# Patient Record
Sex: FEMALE | Race: WHITE | Employment: OTHER | URBAN - METROPOLITAN AREA
[De-identification: names, ages, dates, MRNs, and addresses within clinical notes are randomized per-mention and may not be internally consistent; named-entity substitution may affect disease eponyms.]

---

## 2017-11-17 ENCOUNTER — HOSPITAL ENCOUNTER (OUTPATIENT)
Dept: MAMMOGRAPHY | Age: 82
Discharge: HOME | End: 2017-11-17
Attending: INTERNAL MEDICINE
Payer: MEDICARE

## 2017-11-17 DIAGNOSIS — Z85.3 PERSONAL HISTORY OF MALIGNANT NEOPLASM OF BREAST: ICD-10-CM

## 2017-11-17 DIAGNOSIS — Z12.31 ENCOUNTER FOR MAMMOGRAM TO ESTABLISH BASELINE MAMMOGRAM: ICD-10-CM

## 2017-11-17 PROCEDURE — 77067 SCR MAMMO BI INCL CAD: CPT

## 2018-11-19 ENCOUNTER — HOSPITAL ENCOUNTER (OUTPATIENT)
Dept: MAMMOGRAPHY | Age: 83
Discharge: HOME | End: 2018-11-19
Attending: INTERNAL MEDICINE
Payer: MEDICARE

## 2018-11-19 DIAGNOSIS — Z12.39 SCREENING BREAST EXAMINATION: ICD-10-CM

## 2018-11-19 PROCEDURE — 77067 SCR MAMMO BI INCL CAD: CPT

## 2019-11-25 ENCOUNTER — HOSPITAL ENCOUNTER (EMERGENCY)
Age: 84
Discharge: HOME | End: 2019-11-25
Attending: EMERGENCY MEDICINE
Payer: MEDICARE

## 2019-11-25 ENCOUNTER — APPOINTMENT (OUTPATIENT)
Dept: GENERAL RADIOLOGY | Age: 84
End: 2019-11-25
Attending: EMERGENCY MEDICINE
Payer: MEDICARE

## 2019-11-25 ENCOUNTER — APPOINTMENT (OUTPATIENT)
Dept: CT IMAGING | Age: 84
End: 2019-11-25
Attending: EMERGENCY MEDICINE
Payer: MEDICARE

## 2019-11-25 VITALS
RESPIRATION RATE: 16 BRPM | BODY MASS INDEX: 27.32 KG/M2 | HEIGHT: 66 IN | SYSTOLIC BLOOD PRESSURE: 162 MMHG | OXYGEN SATURATION: 96 % | HEART RATE: 78 BPM | DIASTOLIC BLOOD PRESSURE: 74 MMHG | WEIGHT: 170 LBS | TEMPERATURE: 97.7 F

## 2019-11-25 DIAGNOSIS — R29.6 RECURRENT FALLS: ICD-10-CM

## 2019-11-25 DIAGNOSIS — R10.84 ABDOMINAL PAIN, GENERALIZED: ICD-10-CM

## 2019-11-25 DIAGNOSIS — R19.7 DIARRHEA, UNSPECIFIED TYPE: Primary | ICD-10-CM

## 2019-11-25 LAB
ALBUMIN SERPL-MCNC: 3.1 G/DL (ref 3.5–5)
ALBUMIN/GLOB SERPL: 1 {RATIO} (ref 1.1–2.2)
ALP SERPL-CCNC: 283 U/L (ref 45–117)
ALT SERPL-CCNC: 28 U/L (ref 12–78)
ANION GAP SERPL CALC-SCNC: 4 MMOL/L (ref 5–15)
APPEARANCE UR: CLEAR
AST SERPL-CCNC: 40 U/L (ref 15–37)
BACTERIA URNS QL MICRO: NEGATIVE /HPF
BASOPHILS # BLD: 0 K/UL (ref 0–0.1)
BASOPHILS NFR BLD: 0 % (ref 0–1)
BILIRUB SERPL-MCNC: 0.6 MG/DL (ref 0.2–1)
BILIRUB UR QL: NEGATIVE
BUN SERPL-MCNC: 20 MG/DL (ref 6–20)
BUN/CREAT SERPL: 35 (ref 12–20)
CALCIUM SERPL-MCNC: 9.3 MG/DL (ref 8.5–10.1)
CHLORIDE SERPL-SCNC: 104 MMOL/L (ref 97–108)
CO2 SERPL-SCNC: 29 MMOL/L (ref 21–32)
COLOR UR: ABNORMAL
CREAT SERPL-MCNC: 0.57 MG/DL (ref 0.55–1.02)
DIFFERENTIAL METHOD BLD: ABNORMAL
EOSINOPHIL # BLD: 0.2 K/UL (ref 0–0.4)
EOSINOPHIL NFR BLD: 2 % (ref 0–7)
EPITH CASTS URNS QL MICRO: ABNORMAL /LPF
ERYTHROCYTE [DISTWIDTH] IN BLOOD BY AUTOMATED COUNT: 13.2 % (ref 11.5–14.5)
GLOBULIN SER CALC-MCNC: 3.2 G/DL (ref 2–4)
GLUCOSE SERPL-MCNC: 104 MG/DL (ref 65–100)
GLUCOSE UR STRIP.AUTO-MCNC: NEGATIVE MG/DL
HCT VFR BLD AUTO: 36.8 % (ref 35–47)
HGB BLD-MCNC: 11.8 G/DL (ref 11.5–16)
HGB UR QL STRIP: NEGATIVE
HYALINE CASTS URNS QL MICRO: ABNORMAL /LPF (ref 0–5)
IMM GRANULOCYTES # BLD AUTO: 0 K/UL (ref 0–0.04)
IMM GRANULOCYTES NFR BLD AUTO: 0 % (ref 0–0.5)
KETONES UR QL STRIP.AUTO: NEGATIVE MG/DL
LACTATE BLD-SCNC: 0.89 MMOL/L (ref 0.4–2)
LEUKOCYTE ESTERASE UR QL STRIP.AUTO: ABNORMAL
LIPASE SERPL-CCNC: 127 U/L (ref 73–393)
LYMPHOCYTES # BLD: 1.5 K/UL (ref 0.8–3.5)
LYMPHOCYTES NFR BLD: 17 % (ref 12–49)
MCH RBC QN AUTO: 32.4 PG (ref 26–34)
MCHC RBC AUTO-ENTMCNC: 32.1 G/DL (ref 30–36.5)
MCV RBC AUTO: 101.1 FL (ref 80–99)
MONOCYTES # BLD: 0.8 K/UL (ref 0–1)
MONOCYTES NFR BLD: 8 % (ref 5–13)
NEUTS SEG # BLD: 6.5 K/UL (ref 1.8–8)
NEUTS SEG NFR BLD: 73 % (ref 32–75)
NITRITE UR QL STRIP.AUTO: NEGATIVE
NRBC # BLD: 0 K/UL (ref 0–0.01)
NRBC BLD-RTO: 0 PER 100 WBC
PH UR STRIP: 6 [PH] (ref 5–8)
PLATELET # BLD AUTO: 228 K/UL (ref 150–400)
PMV BLD AUTO: 9.5 FL (ref 8.9–12.9)
POTASSIUM SERPL-SCNC: 3.9 MMOL/L (ref 3.5–5.1)
PROT SERPL-MCNC: 6.3 G/DL (ref 6.4–8.2)
PROT UR STRIP-MCNC: NEGATIVE MG/DL
RBC # BLD AUTO: 3.64 M/UL (ref 3.8–5.2)
RBC #/AREA URNS HPF: ABNORMAL /HPF (ref 0–5)
SODIUM SERPL-SCNC: 137 MMOL/L (ref 136–145)
SP GR UR REFRACTOMETRY: 1.01 (ref 1–1.03)
TROPONIN I SERPL-MCNC: <0.05 NG/ML
UA: UC IF INDICATED,UAUC: ABNORMAL
UROBILINOGEN UR QL STRIP.AUTO: 0.2 EU/DL (ref 0.2–1)
WBC # BLD AUTO: 9 K/UL (ref 3.6–11)
WBC URNS QL MICRO: ABNORMAL /HPF (ref 0–4)

## 2019-11-25 PROCEDURE — 83605 ASSAY OF LACTIC ACID: CPT

## 2019-11-25 PROCEDURE — 84484 ASSAY OF TROPONIN QUANT: CPT

## 2019-11-25 PROCEDURE — 71045 X-RAY EXAM CHEST 1 VIEW: CPT

## 2019-11-25 PROCEDURE — 74011636320 HC RX REV CODE- 636/320: Performed by: EMERGENCY MEDICINE

## 2019-11-25 PROCEDURE — 80053 COMPREHEN METABOLIC PANEL: CPT

## 2019-11-25 PROCEDURE — 81001 URINALYSIS AUTO W/SCOPE: CPT

## 2019-11-25 PROCEDURE — 36415 COLL VENOUS BLD VENIPUNCTURE: CPT

## 2019-11-25 PROCEDURE — 99285 EMERGENCY DEPT VISIT HI MDM: CPT

## 2019-11-25 PROCEDURE — 73610 X-RAY EXAM OF ANKLE: CPT

## 2019-11-25 PROCEDURE — 74177 CT ABD & PELVIS W/CONTRAST: CPT

## 2019-11-25 PROCEDURE — 87040 BLOOD CULTURE FOR BACTERIA: CPT

## 2019-11-25 PROCEDURE — 74011250636 HC RX REV CODE- 250/636: Performed by: EMERGENCY MEDICINE

## 2019-11-25 PROCEDURE — 85025 COMPLETE CBC W/AUTO DIFF WBC: CPT

## 2019-11-25 PROCEDURE — 87086 URINE CULTURE/COLONY COUNT: CPT

## 2019-11-25 PROCEDURE — 73630 X-RAY EXAM OF FOOT: CPT

## 2019-11-25 PROCEDURE — 83690 ASSAY OF LIPASE: CPT

## 2019-11-25 RX ORDER — OMEPRAZOLE 20 MG/1
20 CAPSULE, DELAYED RELEASE ORAL DAILY
COMMUNITY

## 2019-11-25 RX ORDER — LATANOPROST 50 UG/ML
1 SOLUTION/ DROPS OPHTHALMIC
COMMUNITY

## 2019-11-25 RX ORDER — NAPROXEN 250 MG/1
500 TABLET ORAL 2 TIMES DAILY WITH MEALS
COMMUNITY

## 2019-11-25 RX ORDER — CHOLECALCIFEROL (VITAMIN D3) 125 MCG
CAPSULE ORAL
COMMUNITY

## 2019-11-25 RX ORDER — SODIUM CHLORIDE 0.9 % (FLUSH) 0.9 %
10 SYRINGE (ML) INJECTION
Status: COMPLETED | OUTPATIENT
Start: 2019-11-25 | End: 2019-11-25

## 2019-11-25 RX ORDER — CYCLOBENZAPRINE HCL 10 MG
10 TABLET ORAL
COMMUNITY

## 2019-11-25 RX ADMIN — IOPAMIDOL 100 ML: 755 INJECTION, SOLUTION INTRAVENOUS at 16:57

## 2019-11-25 RX ADMIN — Medication 10 ML: at 16:57

## 2019-11-25 RX ADMIN — SODIUM CHLORIDE 1000 ML: 900 INJECTION, SOLUTION INTRAVENOUS at 14:55

## 2019-11-25 NOTE — ED NOTES
Ambulated pt about 50ft in the hallway with a walker which is her baseline. Pt had a steady gait. Pt required no more assistance than the walker provided.
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
## 2019-11-26 NOTE — DISCHARGE INSTRUCTIONS
You were evaluated in the emergency department for diarrhea and abdominal pain. Your examination was reassuring as was your work-up including CT scan and blood work. It will be important for you to follow-up with your primary care physician in 2-5 days. If you develop worsening symptoms such as unable to eat/drink, worsening fevers, or worsening abdominal pain, please return to the emergency department immediately.

## 2019-11-26 NOTE — ED NOTES
Dr. Antonio Cali has reviewed discharge instructions with the patient and family. The patient and family verbalized understanding. Patient discharged in wheelchair to car in no distress with family.

## 2019-11-26 NOTE — ED NOTES
Bedside and Verbal shift change report given to Mary Paredes (oncoming nurse) by Liberty Delgado (offgoing nurse). Report included the following information SBAR, ED Summary, MAR and Recent Results.

## 2019-11-27 LAB
BACTERIA SPEC CULT: NORMAL
CC UR VC: NORMAL
SERVICE CMNT-IMP: NORMAL

## 2019-11-30 LAB
BACTERIA SPEC CULT: NORMAL
SERVICE CMNT-IMP: NORMAL

## 2020-06-10 ENCOUNTER — HOSPITAL ENCOUNTER (OUTPATIENT)
Dept: MAMMOGRAPHY | Age: 85
Discharge: HOME | End: 2020-06-10
Attending: FAMILY MEDICINE
Payer: MEDICARE

## 2020-06-10 DIAGNOSIS — Z12.31 VISIT FOR SCREENING MAMMOGRAM: ICD-10-CM

## 2020-06-10 PROCEDURE — 77067 SCR MAMMO BI INCL CAD: CPT

## 2021-05-19 ENCOUNTER — TRANSCRIBE ORDER (OUTPATIENT)
Dept: SCHEDULING | Age: 86
End: 2021-05-19

## 2021-05-19 DIAGNOSIS — Z12.31 BREAST CANCER SCREENING BY MAMMOGRAM: Primary | ICD-10-CM

## 2021-06-11 ENCOUNTER — HOSPITAL ENCOUNTER (OUTPATIENT)
Dept: MAMMOGRAPHY | Age: 86
Discharge: HOME | End: 2021-06-11
Attending: INTERNAL MEDICINE
Payer: MEDICARE

## 2021-06-11 DIAGNOSIS — Z12.31 BREAST CANCER SCREENING BY MAMMOGRAM: ICD-10-CM

## 2021-06-11 PROCEDURE — 77067 SCR MAMMO BI INCL CAD: CPT

## 2022-05-04 ENCOUNTER — APPOINTMENT (OUTPATIENT)
Dept: GENERAL RADIOLOGY | Age: 87
End: 2022-05-04
Attending: EMERGENCY MEDICINE
Payer: MEDICARE

## 2022-05-04 ENCOUNTER — HOSPITAL ENCOUNTER (EMERGENCY)
Age: 87
Discharge: HOME | End: 2022-05-04
Attending: EMERGENCY MEDICINE
Payer: MEDICARE

## 2022-05-04 ENCOUNTER — APPOINTMENT (OUTPATIENT)
Dept: GENERAL RADIOLOGY | Age: 87
End: 2022-05-04
Attending: STUDENT IN AN ORGANIZED HEALTH CARE EDUCATION/TRAINING PROGRAM
Payer: MEDICARE

## 2022-05-04 ENCOUNTER — APPOINTMENT (OUTPATIENT)
Dept: GENERAL RADIOLOGY | Age: 87
End: 2022-05-04
Attending: PHYSICIAN ASSISTANT
Payer: MEDICARE

## 2022-05-04 VITALS
TEMPERATURE: 98.9 F | BODY MASS INDEX: 24.43 KG/M2 | HEART RATE: 91 BPM | HEIGHT: 66 IN | RESPIRATION RATE: 18 BRPM | OXYGEN SATURATION: 95 % | SYSTOLIC BLOOD PRESSURE: 123 MMHG | DIASTOLIC BLOOD PRESSURE: 65 MMHG | WEIGHT: 152 LBS
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
## 2022-05-04 DIAGNOSIS — M79.604 RIGHT LEG PAIN: ICD-10-CM

## 2022-05-04 PROCEDURE — 73610 X-RAY EXAM OF ANKLE: CPT

## 2022-05-04 PROCEDURE — 73502 X-RAY EXAM HIP UNI 2-3 VIEWS: CPT

## 2022-05-04 PROCEDURE — 99283 EMERGENCY DEPT VISIT LOW MDM: CPT

## 2022-05-04 PROCEDURE — 73552 X-RAY EXAM OF FEMUR 2/>: CPT

## 2022-05-04 PROCEDURE — 74011250637 HC RX REV CODE- 250/637: Performed by: PHYSICIAN ASSISTANT

## 2022-05-04 PROCEDURE — 73630 X-RAY EXAM OF FOOT: CPT

## 2022-05-04 PROCEDURE — 73590 X-RAY EXAM OF LOWER LEG: CPT

## 2022-05-04 RX ORDER — ACETAMINOPHEN 325 MG/1
650 TABLET ORAL
Qty: 20 TABLET | Refills: 0 | Status: SHIPPED | OUTPATIENT
Start: 2022-05-04

## 2022-05-04 RX ORDER — ACETAMINOPHEN 500 MG
1000 TABLET ORAL
Status: COMPLETED | OUTPATIENT
Start: 2022-05-04 | End: 2022-05-04

## 2022-05-04 RX ORDER — TRAMADOL HYDROCHLORIDE 50 MG/1
50 TABLET ORAL
Status: DISCONTINUED | OUTPATIENT
Start: 2022-05-04 | End: 2022-05-04

## 2022-05-04 RX ORDER — DICLOFENAC SODIUM 10 MG/G
2 GEL TOPICAL 4 TIMES DAILY
Qty: 1 EACH | Refills: 0 | Status: SHIPPED
Start: 2022-05-04 | End: 2022-05-17

## 2022-05-04 RX ADMIN — ACETAMINOPHEN 1000 MG: 500 TABLET ORAL at 15:51

## 2022-05-04 NOTE — DISCHARGE INSTRUCTIONS
Call your primary care doctor first thing tomorrow to discuss your visit to the emergency department and assistance with possible home health/physical therapy. If you are continuing to have pain in your leg after 2 to 3 days, follow-up with your primary care doctor or orthopedic doctor listed in your paperwork for further evaluation.   You may return the emergency department sooner if you have any new or worsening symptoms

## 2022-05-04 NOTE — ED PROVIDER NOTES
EMERGENCY DEPARTMENT HISTORY AND PHYSICAL EXAM      Date: 5/4/2022  Patient Name: Rhina Son    History of Presenting Illness     Chief Complaint   Patient presents with   [facility] Fall     EMS reports that she slid frpm chair, right hip and right ankle       History Provided By: Patient    HPI: Rhina Son, 80 y.o. female with a past medical history of hypertension, hyperlipidemia, hypothyroidism, hearing impairment who presents emergency department after ground-level fall. She is accompanied by her 2 daughters who did not witness the fall, but help provide amplifying history. Patient was getting out of bed this morning and slipped, landing on her right hip. She has a history of carpal tunnel and trigger finger, which results in weak  strength. He denies any preceding symptoms to include headache, chest pain, shortness of breath, palpitations or dizziness. Patient was not able to get up and therefore called EMS. Patient complains primarily of pain on her right hip/leg and right ankle. In the ED she denies any headache, dizziness, vision changes, chest pain, shortness of breath, abdominal pain, nausea, vomiting, bowel or bladder changes, paresthesias or motor weakness. Per the daughters, patient does live at home by herself despite their efforts to place her into an assisted living facility. There are no other complaints, changes, or physical findings at this time. PCP: Dejan Ellsworth MD    No current facility-administered medications on file prior to encounter. Current Outpatient Medications on File Prior to Encounter   Medication Sig Dispense Refill    naproxen (NAPROSYN) 250 mg tablet Take 500 mg by mouth two (2) times daily (with meals).  omeprazole (PRILOSEC) 20 mg capsule Take 20 mg by mouth daily.  cyclobenzaprine (FLEXERIL) 10 mg tablet Take 10 mg by mouth.  latanoprost (XALATAN) 0.005 % ophthalmic solution Administer 1 Drop to both eyes nightly.       cholecalciferol, vitamin D3, 2,000 unit tab Take  by mouth.  senna-docusate (PERICOLACE) 8.6-50 mg per tablet Take 1 Tab by mouth two (2) times a day. 30 Tab 0    acetaminophen 650 mg Tab Take 650 mg by mouth every four (4) hours as needed for Pain. 40 Tab 0    furosemide (LASIX) 40 mg tablet Take 40 mg by mouth daily.  levothyroxine (SYNTHROID) 100 mcg tablet Take 100 mcg by mouth daily.  lovastatin (MEVACOR) 20 mg tablet Take 20 mg by mouth daily.  SENNOSIDES (SENNA LAX PO) Take  by mouth as needed. Past History     Past Medical History:  Past Medical History:   Diagnosis Date    Arthritis     Breast cancer ([facility] [address] 1998    right breast    CAD (coronary artery disease)     2 heart attacks    Cancer (HCC)     breast - treated with surgery    GERD (gastroesophageal reflux disease)     hiatal hernia    Other ill-defined conditions(799.89)     increased cholesterol    PUD (peptic ulcer disease)     Thyroid disease        Past Surgical History:  Past Surgical History:   Procedure Laterality Date    HX CHOLECYSTECTOMY      HX DILATION AND CURETTAGE      x 3-4    HX HYSTERECTOMY      total hysterectomy    HX MASTECTOMY Right 1998    right    HX ORTHOPAEDIC  12/31/12    PATELLA OPEN REDUCTION INTERNAL FIXATION (Left       Family History:  Family History   Problem Relation Age of Onset    Thyroid Disease Mother     Lung Disease Father     Other Daughter         \"almost lost her\" - unsure of details    Breast Cancer Daughter 55       Social History:  Social History     Tobacco Use    Smoking status: Former Smoker    Smokeless tobacco: Not on file    Tobacco comment: former cigarette smoker for 5 yrs/quit 15+ yrs ago   Substance Use Topics    Alcohol use: No    Drug use: Not on file       Allergies:   Allergies   Allergen Reactions    Codeine Nausea and Vomiting    Penicillins Itching and Swelling     swelling at injection site         Review of Systems   Review of Systems Constitutional: Negative for fever. HENT: Negative for dental problem. Eyes: Negative for visual disturbance. Respiratory: Negative for shortness of breath. Cardiovascular: Negative for chest pain. Gastrointestinal: Negative for abdominal pain, diarrhea, nausea and vomiting. Musculoskeletal: Positive for arthralgias. Negative for joint swelling and myalgias. Skin: Negative for rash. Neurological: Negative for weakness, numbness and headaches. Hematological: Does not bruise/bleed easily. All other systems reviewed and are negative. Physical Exam   Physical Exam  Vitals and nursing note reviewed. Constitutional:       General: She is not in acute distress. Appearance: Normal appearance. She is not ill-appearing or diaphoretic. Comments: Patient well-appearing, no acute distress. Patient does have some difficulties hearing, but able to interpret simple sentences and respond. HENT:      Head: Normocephalic and atraumatic. Comments: Head normocephalic with no bruising, swelling or deformities. No signs of trauma. Right Ear: External ear normal.      Left Ear: External ear normal.      Nose: Nose normal. No rhinorrhea. Mouth/Throat:      Mouth: Mucous membranes are moist.   Eyes:      Conjunctiva/sclera: Conjunctivae normal.      Pupils: Pupils are equal, round, and reactive to light. Cardiovascular:      Rate and Rhythm: Normal rate and regular rhythm. Pulses: Normal pulses. Heart sounds: Normal heart sounds. No murmur heard. No friction rub. No gallop. Pulmonary:      Effort: No respiratory distress. Breath sounds: No stridor. No wheezing, rhonchi or rales. Abdominal:      General: Abdomen is flat. There is no distension. Tenderness: There is no abdominal tenderness. There is no guarding. Musculoskeletal:         General: Tenderness present. No swelling, deformity or signs of injury.       Cervical back: Normal range of motion and neck supple. No tenderness. Comments: Right hip normal to inspection with no ecchymosis swelling or erythema. Soft tissue and bony tenderness on lateral right leg extending over mid thigh. No skin tenting or deformity + mild recent producible pain with internal rotation of right hip. Otherwise no reproducible pain with passive range of motion of the right or left hip. No pain with passive range of motion of the knee. Knee normal to inspection. Right ankle and foot with moderate swelling over dorsum of foot. No ecchymosis or bony deformity   Skin:     General: Skin is warm. Neurological:      General: No focal deficit present. Mental Status: She is alert and oriented to person, place, and time. Mental status is at baseline. Sensory: No sensory deficit. Motor: No weakness. Gait: Gait normal.         Diagnostic Study Results     Labs -   No results found for this or any previous visit (from the past 12 hour(s)). Radiologic Studies -   XR FEMUR RT 2 VS   Final Result   No acute fracture. XR TIB/FIB RT   Final Result      No fracture within the limitation of osteopenia. XR FOOT RT MIN 3 V   Final Result      No fracture within the limitation of osteopenia. XR HIP RT W OR WO PELV 2-3 VWS   Final Result   No acute abnormality. XR ANKLE RT MIN 3 V   Final Result   No acute fracture or dislocation. CT Results  (Last 48 hours)    None        CXR Results  (Last 48 hours)    None            Medical Decision Making   I am the first provider for this patient. I reviewed the vital signs, available nursing notes, past medical history, past surgical history, family history and social history. Vital Signs-Reviewed the patient's vital signs.   Patient Vitals for the past 12 hrs:   Temp Pulse Resp BP SpO2   05/04/22 1330 98.9 °F (37.2 °C) 91 18 123/65 95 %       Records Reviewed: Nursing Notes    Provider Notes (Medical Decision Making):   Patient evaluated for a ground-level fall onto her right side earlier today. She had no preceding or subsequent symptoms concerning for nonmechanical fall. On exam she had a GCS of 15 with no obvious signs of emergent trauma. Her pain was largely localized to her right foot and right mid/proximal right thigh. X-rays of the right hip, thigh, leg and foot showed no acute fracture or dislocation. She had no turning pain with passive range of motion of hips bilaterally concerning for fracture. She was able to ambulate and bear weight on her legs. Patient had 2 daughters at bedside during the patient's ED course. Daughters expressed the desire to return home with the patient and that they would have the ability to give her 24-hour care over the next few days. They were advised on follow-up with a PCP to discuss her recurrent falls and possible therapy versus placement at a assisted living facility. They are advised on follow-up with orthopedic doctor if patient continued to have pain beyond 2 to 3 days. They were advised on return precautions to the emergency department. patient and daughters expressed understanding agreement to discharge instructions and treatment plan. ED Course:   Initial assessment performed. The patients presenting problems have been discussed, and they are in agreement with the care plan formulated and outlined with them. I have encouraged them to ask questions as they arise throughout their visit. ED Course as of 05/04/22 2245   Wed May 04, 2022   1526 XR HIP RT W OR WO PELV 2-3 VWS [AJ]      ED Course User Index  [AJ] SEBLE Rooney       Critical Care Time: None    Disposition:  discharged    PLAN:  1.    Discharge Medication List as of 5/4/2022  5:39 PM      START taking these medications    Details   !! acetaminophen (TYLENOL) 325 mg tablet Take 2 Tablets by mouth every six (6) hours as needed for Pain., Normal, Disp-20 Tablet, R-0      diclofenac (Voltaren) 1 % gel Apply 2 g to affected area four (4) times daily for 5 days. , Normal, Disp-1 Each, R-0       !! - Potential duplicate medications found. Please discuss with provider. CONTINUE these medications which have NOT CHANGED    Details   naproxen (NAPROSYN) 250 mg tablet Take 500 mg by mouth two (2) times daily (with meals). , Historical Med      omeprazole (PRILOSEC) 20 mg capsule Take 20 mg by mouth daily. , Historical Med      cyclobenzaprine (FLEXERIL) 10 mg tablet Take 10 mg by mouth., Historical Med      latanoprost (XALATAN) 0.005 % ophthalmic solution Administer 1 Drop to both eyes nightly., Historical Med      cholecalciferol, vitamin D3, 2,000 unit tab Take  by mouth., Historical Med      senna-docusate (PERICOLACE) 8.6-50 mg per tablet Take 1 Tab by mouth two (2) times a day., No Print, Disp-30 Tab, R-0      !! acetaminophen 650 mg Tab Take 650 mg by mouth every four (4) hours as needed for Pain., No Print, Disp-40 Tab, R-0      furosemide (LASIX) 40 mg tablet Take 40 mg by mouth daily. , Historical Med      levothyroxine (SYNTHROID) 100 mcg tablet Take 100 mcg by mouth daily. , Historical Med      lovastatin (MEVACOR) 20 mg tablet Take 20 mg by mouth daily. , Historical Med      SENNOSIDES (SENNA LAX PO) Take  by mouth as needed.  , Historical Med       !! - Potential duplicate medications found. Please discuss with provider. 2.   Follow-up Information     Follow up With Specialties Details Why Contact Info    Alfredo Cevallos MD Family Medicine In 2 days  [facility] [address]  In 1 week As needed [address]  [address] [facility] [id number] [phone]    hospitals EMERGENCY DEPT Emergency Medicine  If symptoms worsen [address]  [address] [facility]  [phone]        Return to ED if worse     Diagnosis     Clinical Impression:   1. Fall, initial encounter    2.  Right leg pain          Please note that this dictation was completed with Dragon, the computer voice recognition software. Quite often unanticipated grammatical, syntax, homophones, and other interpretive errors are inadvertently transcribed by the computer software. Please disregards these errors. Please excuse any errors that have escaped final proofreading.

## 2022-05-09 ENCOUNTER — APPOINTMENT (OUTPATIENT)
Dept: GENERAL RADIOLOGY | Age: 87
DRG: 203 | End: 2022-05-09
Attending: EMERGENCY MEDICINE
Payer: MEDICARE

## 2022-05-09 ENCOUNTER — APPOINTMENT (OUTPATIENT)
Dept: GENERAL RADIOLOGY | Age: 87
DRG: 203 | End: 2022-05-09
Attending: STUDENT IN AN ORGANIZED HEALTH CARE EDUCATION/TRAINING PROGRAM
Payer: MEDICARE

## 2022-05-09 ENCOUNTER — HOSPITAL ENCOUNTER (INPATIENT)
Age: 87
LOS: 8 days | Discharge: NURSING, REHAB OR LONG-TERM CARE | DRG: 203 | End: 2022-05-17
Attending: EMERGENCY MEDICINE | Admitting: STUDENT IN AN ORGANIZED HEALTH CARE EDUCATION/TRAINING PROGRAM
Payer: MEDICARE

## 2022-05-09 ENCOUNTER — APPOINTMENT (OUTPATIENT)
Dept: CT IMAGING | Age: 87
DRG: 203 | End: 2022-05-09
Attending: STUDENT IN AN ORGANIZED HEALTH CARE EDUCATION/TRAINING PROGRAM
Payer: MEDICARE

## 2022-05-09 ENCOUNTER — APPOINTMENT (OUTPATIENT)
Dept: CT IMAGING | Age: 87
DRG: 203 | End: 2022-05-09
Attending: EMERGENCY MEDICINE
Payer: MEDICARE

## 2022-05-09 ENCOUNTER — APPOINTMENT (OUTPATIENT)
Dept: ULTRASOUND IMAGING | Age: 87
DRG: 203 | End: 2022-05-09
Attending: EMERGENCY MEDICINE
Payer: MEDICARE

## 2022-05-09 DIAGNOSIS — R06.02 SOB (SHORTNESS OF BREATH): ICD-10-CM

## 2022-05-09 DIAGNOSIS — M25.551 RIGHT HIP PAIN: ICD-10-CM

## 2022-05-09 DIAGNOSIS — R53.1 GENERALIZED WEAKNESS: ICD-10-CM

## 2022-05-09 DIAGNOSIS — J18.9 COMMUNITY ACQUIRED PNEUMONIA, UNSPECIFIED LATERALITY: Primary | ICD-10-CM

## 2022-05-09 LAB
ALBUMIN SERPL-MCNC: 2.6 G/DL (ref 3.5–5)
ALBUMIN/GLOB SERPL: 0.6 {RATIO} (ref 1.1–2.2)
ALP SERPL-CCNC: 150 U/L (ref 45–117)
ALT SERPL-CCNC: 24 U/L (ref 12–78)
ANION GAP SERPL CALC-SCNC: 5 MMOL/L (ref 5–15)
AST SERPL-CCNC: 10 U/L (ref 15–37)
BASOPHILS # BLD: 0.1 K/UL (ref 0–0.1)
BASOPHILS NFR BLD: 1 % (ref 0–1)
BILIRUB SERPL-MCNC: 0.4 MG/DL (ref 0.2–1)
BNP SERPL-MCNC: 618 PG/ML
BUN SERPL-MCNC: 12 MG/DL (ref 6–20)
BUN/CREAT SERPL: 21 (ref 12–20)
CALCIUM SERPL-MCNC: 9.6 MG/DL (ref 8.5–10.1)
CHLORIDE SERPL-SCNC: 102 MMOL/L (ref 97–108)
CO2 SERPL-SCNC: 29 MMOL/L (ref 21–32)
COVID-19 RAPID TEST, COVR: NOT DETECTED
CREAT SERPL-MCNC: 0.56 MG/DL (ref 0.55–1.02)
D DIMER PPP FEU-MCNC: 2.03 MG/L FEU (ref 0–0.65)
DIFFERENTIAL METHOD BLD: ABNORMAL
EOSINOPHIL # BLD: 0 K/UL (ref 0–0.4)
EOSINOPHIL NFR BLD: 0 % (ref 0–7)
ERYTHROCYTE [DISTWIDTH] IN BLOOD BY AUTOMATED COUNT: 13 % (ref 11.5–14.5)
GLOBULIN SER CALC-MCNC: 4.1 G/DL (ref 2–4)
GLUCOSE SERPL-MCNC: 145 MG/DL (ref 65–100)
HCT VFR BLD AUTO: 40 % (ref 35–47)
HGB BLD-MCNC: 12.7 G/DL (ref 11.5–16)
IMM GRANULOCYTES # BLD AUTO: 0 K/UL (ref 0–0.04)
IMM GRANULOCYTES NFR BLD AUTO: 0 % (ref 0–0.5)
LACTATE BLD-SCNC: 1.23 MMOL/L (ref 0.4–2)
LYMPHOCYTES # BLD: 2.3 K/UL (ref 0.8–3.5)
LYMPHOCYTES NFR BLD: 35 % (ref 12–49)
MCH RBC QN AUTO: 31.8 PG (ref 26–34)
MCHC RBC AUTO-ENTMCNC: 31.8 G/DL (ref 30–36.5)
MCV RBC AUTO: 100 FL (ref 80–99)
METAMYELOCYTES NFR BLD MANUAL: 2 %
MONOCYTES # BLD: 0.3 K/UL (ref 0–1)
MONOCYTES NFR BLD: 4 % (ref 5–13)
MYELOCYTES NFR BLD MANUAL: 2 %
NEUTS BAND NFR BLD MANUAL: 3 %
NEUTS SEG # BLD: 3.6 K/UL (ref 1.8–8)
NEUTS SEG NFR BLD: 53 % (ref 32–75)
NRBC # BLD: 0 K/UL (ref 0–0.01)
NRBC BLD-RTO: 0 PER 100 WBC
PLATELET # BLD AUTO: 282 K/UL (ref 150–400)
PMV BLD AUTO: 8.7 FL (ref 8.9–12.9)
POTASSIUM SERPL-SCNC: 4 MMOL/L (ref 3.5–5.1)
PROT SERPL-MCNC: 6.7 G/DL (ref 6.4–8.2)
RBC # BLD AUTO: 4 M/UL (ref 3.8–5.2)
RBC MORPH BLD: ABNORMAL
SODIUM SERPL-SCNC: 136 MMOL/L (ref 136–145)
SOURCE, COVRS: NORMAL
TROPONIN-HIGH SENSITIVITY: 11 NG/L (ref 0–51)
WBC # BLD AUTO: 6.5 K/UL (ref 3.6–11)
WBC MORPH BLD: ABNORMAL

## 2022-05-09 PROCEDURE — 94761 N-INVAS EAR/PLS OXIMETRY MLT: CPT

## 2022-05-09 PROCEDURE — 96374 THER/PROPH/DIAG INJ IV PUSH: CPT

## 2022-05-09 PROCEDURE — 93970 EXTREMITY STUDY: CPT

## 2022-05-09 PROCEDURE — 74011000250 HC RX REV CODE- 250: Performed by: STUDENT IN AN ORGANIZED HEALTH CARE EDUCATION/TRAINING PROGRAM

## 2022-05-09 PROCEDURE — 99285 EMERGENCY DEPT VISIT HI MDM: CPT

## 2022-05-09 PROCEDURE — 87635 SARS-COV-2 COVID-19 AMP PRB: CPT

## 2022-05-09 PROCEDURE — 72192 CT PELVIS W/O DYE: CPT

## 2022-05-09 PROCEDURE — 71045 X-RAY EXAM CHEST 1 VIEW: CPT

## 2022-05-09 PROCEDURE — 93005 ELECTROCARDIOGRAM TRACING: CPT

## 2022-05-09 PROCEDURE — 84484 ASSAY OF TROPONIN QUANT: CPT

## 2022-05-09 PROCEDURE — 36415 COLL VENOUS BLD VENIPUNCTURE: CPT

## 2022-05-09 PROCEDURE — 83880 ASSAY OF NATRIURETIC PEPTIDE: CPT

## 2022-05-09 PROCEDURE — 80053 COMPREHEN METABOLIC PANEL: CPT

## 2022-05-09 PROCEDURE — 85025 COMPLETE CBC W/AUTO DIFF WBC: CPT

## 2022-05-09 PROCEDURE — 71275 CT ANGIOGRAPHY CHEST: CPT

## 2022-05-09 PROCEDURE — 73560 X-RAY EXAM OF KNEE 1 OR 2: CPT

## 2022-05-09 PROCEDURE — 83605 ASSAY OF LACTIC ACID: CPT

## 2022-05-09 PROCEDURE — 96375 TX/PRO/DX INJ NEW DRUG ADDON: CPT

## 2022-05-09 PROCEDURE — 65270000046 HC RM TELEMETRY

## 2022-05-09 PROCEDURE — 74011250636 HC RX REV CODE- 250/636: Performed by: EMERGENCY MEDICINE

## 2022-05-09 PROCEDURE — 73030 X-RAY EXAM OF SHOULDER: CPT

## 2022-05-09 PROCEDURE — 85379 FIBRIN DEGRADATION QUANT: CPT

## 2022-05-09 PROCEDURE — 74011000636 HC RX REV CODE- 636: Performed by: EMERGENCY MEDICINE

## 2022-05-09 RX ORDER — ONDANSETRON 4 MG/1
4 TABLET, ORALLY DISINTEGRATING ORAL
Status: DISCONTINUED | OUTPATIENT
Start: 2022-05-09 | End: 2022-05-17 | Stop reason: HOSPADM

## 2022-05-09 RX ORDER — LEVOFLOXACIN 5 MG/ML
750 INJECTION, SOLUTION INTRAVENOUS
Status: DISCONTINUED | OUTPATIENT
Start: 2022-05-09 | End: 2022-05-10

## 2022-05-09 RX ORDER — LATANOPROST 50 UG/ML
1 SOLUTION/ DROPS OPHTHALMIC
Status: DISCONTINUED | OUTPATIENT
Start: 2022-05-09 | End: 2022-05-17 | Stop reason: HOSPADM

## 2022-05-09 RX ORDER — ENOXAPARIN SODIUM 100 MG/ML
40 INJECTION SUBCUTANEOUS DAILY
Status: DISCONTINUED | OUTPATIENT
Start: 2022-05-10 | End: 2022-05-17 | Stop reason: HOSPADM

## 2022-05-09 RX ORDER — ONDANSETRON 2 MG/ML
4 INJECTION INTRAMUSCULAR; INTRAVENOUS
Status: COMPLETED | OUTPATIENT
Start: 2022-05-09 | End: 2022-05-09

## 2022-05-09 RX ORDER — MORPHINE SULFATE 2 MG/ML
2 INJECTION, SOLUTION INTRAMUSCULAR; INTRAVENOUS
Status: COMPLETED | OUTPATIENT
Start: 2022-05-09 | End: 2022-05-09

## 2022-05-09 RX ORDER — POLYETHYLENE GLYCOL 3350 17 G/17G
17 POWDER, FOR SOLUTION ORAL DAILY PRN
Status: DISCONTINUED | OUTPATIENT
Start: 2022-05-09 | End: 2022-05-17 | Stop reason: HOSPADM

## 2022-05-09 RX ORDER — FUROSEMIDE 40 MG/1
40 TABLET ORAL DAILY
Status: DISCONTINUED | OUTPATIENT
Start: 2022-05-10 | End: 2022-05-12

## 2022-05-09 RX ORDER — PANTOPRAZOLE SODIUM 40 MG/1
40 TABLET, DELAYED RELEASE ORAL
Status: DISCONTINUED | OUTPATIENT
Start: 2022-05-10 | End: 2022-05-17 | Stop reason: HOSPADM

## 2022-05-09 RX ORDER — ACETAMINOPHEN 325 MG/1
650 TABLET ORAL
Status: DISCONTINUED | OUTPATIENT
Start: 2022-05-09 | End: 2022-05-17 | Stop reason: HOSPADM

## 2022-05-09 RX ORDER — LEVOTHYROXINE SODIUM 100 UG/1
100 TABLET ORAL
Status: DISCONTINUED | OUTPATIENT
Start: 2022-05-10 | End: 2022-05-17 | Stop reason: HOSPADM

## 2022-05-09 RX ORDER — LEVOTHYROXINE SODIUM 50 UG/1
100 TABLET ORAL DAILY
Status: DISCONTINUED | OUTPATIENT
Start: 2022-05-10 | End: 2022-05-09

## 2022-05-09 RX ORDER — ATORVASTATIN CALCIUM 10 MG/1
10 TABLET, FILM COATED ORAL
Status: DISCONTINUED | OUTPATIENT
Start: 2022-05-09 | End: 2022-05-13

## 2022-05-09 RX ORDER — ACETAMINOPHEN 650 MG/1
650 SUPPOSITORY RECTAL
Status: DISCONTINUED | OUTPATIENT
Start: 2022-05-09 | End: 2022-05-17 | Stop reason: HOSPADM

## 2022-05-09 RX ORDER — SODIUM CHLORIDE 0.9 % (FLUSH) 0.9 %
5-40 SYRINGE (ML) INJECTION EVERY 8 HOURS
Status: DISCONTINUED | OUTPATIENT
Start: 2022-05-09 | End: 2022-05-17 | Stop reason: HOSPADM

## 2022-05-09 RX ORDER — LEVOFLOXACIN 5 MG/ML
750 INJECTION, SOLUTION INTRAVENOUS EVERY 24 HOURS
Status: DISCONTINUED | OUTPATIENT
Start: 2022-05-09 | End: 2022-05-09

## 2022-05-09 RX ORDER — ONDANSETRON 2 MG/ML
4 INJECTION INTRAMUSCULAR; INTRAVENOUS
Status: DISCONTINUED | OUTPATIENT
Start: 2022-05-09 | End: 2022-05-17 | Stop reason: HOSPADM

## 2022-05-09 RX ORDER — SODIUM CHLORIDE 0.9 % (FLUSH) 0.9 %
5-40 SYRINGE (ML) INJECTION AS NEEDED
Status: DISCONTINUED | OUTPATIENT
Start: 2022-05-09 | End: 2022-05-17 | Stop reason: HOSPADM

## 2022-05-09 RX ADMIN — SODIUM CHLORIDE, PRESERVATIVE FREE 10 ML: 5 INJECTION INTRAVENOUS at 23:26

## 2022-05-09 RX ADMIN — MORPHINE SULFATE 2 MG: 2 INJECTION, SOLUTION INTRAMUSCULAR; INTRAVENOUS at 20:25

## 2022-05-09 RX ADMIN — ONDANSETRON 4 MG: 2 INJECTION INTRAMUSCULAR; INTRAVENOUS at 20:25

## 2022-05-09 RX ADMIN — IOPAMIDOL 60 ML: 755 INJECTION, SOLUTION INTRAVENOUS at 19:34

## 2022-05-09 NOTE — ED PROVIDER NOTES
EMERGENCY DEPARTMENT HISTORY AND PHYSICAL EXAM      Date: 5/9/2022  Patient Name: Parker Banks    History of Presenting Illness     Chief Complaint   Patient presents with    Cough     pt sent to ED by doctor for chest congestion and short of breath. started on Zpack last monday. seen in office today. History Provided By: Patient, Patient's  and Family member    HPI: Parker Banks, 80 y.o. female presents to the ED with cc of cough and shortness of breath. Patient symptoms started 8 days ago. She is fully vaccinated against COVID-19, and has been boosted. She was started on Zithromax 1 week ago, which has not improved her symptoms.  states that she has been  sitting in her recliner all week, because she is too weak to get up and use her walker. She was seen in the ER on May 4 for a fall, had x-rays of her extremities which were unremarkable. Another family member is concerned that she may have an occult fracture. Patient has pain in the right hip when she elevates that leg, and also has right knee pain. She also complains of pain in the right shoulder, but she is able to raise that, although her range of motion is decreased. Patient denies chest pain, but does have pain in her left abdomen when she coughs. She says that is of moderate severity when present. She denies dysuria or change in bowel habits. Patient denies headache. There are no other complaints, changes, or physical findings at this time. PCP: Thresea Gaucher, MD    No current facility-administered medications on file prior to encounter. Current Outpatient Medications on File Prior to Encounter   Medication Sig Dispense Refill    acetaminophen (TYLENOL) 325 mg tablet Take 2 Tablets by mouth every six (6) hours as needed for Pain. 20 Tablet 0    diclofenac (Voltaren) 1 % gel Apply 2 g to affected area four (4) times daily for 5 days.  1 Each 0    naproxen (NAPROSYN) 250 mg tablet Take 500 mg by mouth two (2) times daily (with meals).  omeprazole (PRILOSEC) 20 mg capsule Take 20 mg by mouth daily.  cyclobenzaprine (FLEXERIL) 10 mg tablet Take 10 mg by mouth.  latanoprost (XALATAN) 0.005 % ophthalmic solution Administer 1 Drop to both eyes nightly.  cholecalciferol, vitamin D3, 2,000 unit tab Take  by mouth.  senna-docusate (PERICOLACE) 8.6-50 mg per tablet Take 1 Tab by mouth two (2) times a day. 30 Tab 0    acetaminophen 650 mg Tab Take 650 mg by mouth every four (4) hours as needed for Pain. 40 Tab 0    furosemide (LASIX) 40 mg tablet Take 40 mg by mouth daily.  levothyroxine (SYNTHROID) 100 mcg tablet Take 100 mcg by mouth daily.  lovastatin (MEVACOR) 20 mg tablet Take 20 mg by mouth daily.  SENNOSIDES (SENNA LAX PO) Take  by mouth as needed.            Past History     Past Medical History:  Past Medical History:   Diagnosis Date    Arthritis     Breast cancer ([facility] [address] 1998    right breast    CAD (coronary artery disease)     2 heart attacks    Cancer (HCC)     breast - treated with surgery    GERD (gastroesophageal reflux disease)     hiatal hernia    Other ill-defined conditions(799.89)     increased cholesterol    PUD (peptic ulcer disease)     Thyroid disease        Past Surgical History:  Past Surgical History:   Procedure Laterality Date    HX CHOLECYSTECTOMY      HX DILATION AND CURETTAGE      x 3-4    HX HYSTERECTOMY      total hysterectomy    HX MASTECTOMY Right 1998    right    HX ORTHOPAEDIC  12/31/12    PATELLA OPEN REDUCTION INTERNAL FIXATION (Left       Family History:  Family History   Problem Relation Age of Onset    Thyroid Disease Mother     Lung Disease Father     Other Daughter         \"almost lost her\" - unsure of details    Breast Cancer Daughter 55       Social History:  Social History     Tobacco Use    Smoking status: Former Smoker    Smokeless tobacco: Not on file    Tobacco comment: former cigarette smoker for 5 yrs/quit 15+ yrs ago   Substance Use Topics    Alcohol use: No    Drug use: Not on file       Allergies: Allergies   Allergen Reactions    Codeine Nausea and Vomiting    Penicillins Itching and Swelling     swelling at injection site         Review of Systems   Review of Systems   Constitutional: Negative for fever. HENT: Negative for congestion. Eyes: Negative. Respiratory: Positive for shortness of breath. Cardiovascular: Negative for chest pain. Gastrointestinal: Positive for abdominal pain. Endocrine: Negative for heat intolerance. Genitourinary: Negative for dysuria. Musculoskeletal: Negative for back pain. Skin: Negative for rash. Allergic/Immunologic: Negative for immunocompromised state. Neurological: Positive for weakness. Negative for dizziness. Hematological: Does not bruise/bleed easily. Psychiatric/Behavioral: Negative. All other systems reviewed and are negative. Physical Exam   Physical Exam  Vitals and nursing note reviewed. Constitutional:       General: She is not in acute distress. Appearance: She is well-developed. HENT:      Head: Normocephalic. Cardiovascular:      Rate and Rhythm: Normal rate and regular rhythm. Pulses: Normal pulses. Heart sounds: Normal heart sounds. Pulmonary:      Effort: Pulmonary effort is normal.      Breath sounds: Rales present. Abdominal:      General: Bowel sounds are normal.      Palpations: Abdomen is soft. Tenderness: There is no abdominal tenderness. Musculoskeletal:         General: Tenderness present. Normal range of motion. Cervical back: Normal range of motion and neck supple. Right lower leg: Edema present. Left lower leg: Edema present. Comments: Right hip and right knee tenderness bilateral lower extremity edema, and tenderness   Skin:     General: Skin is warm and dry. Neurological:      General: No focal deficit present.       Mental Status: She is alert and oriented to person, place, and time. Psychiatric:         Mood and Affect: Mood normal.         Behavior: Behavior normal.         Diagnostic Study Results     Labs -     Recent Results (from the past 12 hour(s))   EKG, 12 LEAD, INITIAL    Collection Time: 05/09/22  4:50 PM   Result Value Ref Range    Ventricular Rate 82 BPM    Atrial Rate 82 BPM    P-R Interval 194 ms    QRS Duration 116 ms    Q-T Interval 344 ms    QTC Calculation (Bezet) 401 ms    Calculated P Axis 49 degrees    Calculated R Axis -46 degrees    Calculated T Axis 68 degrees    Diagnosis       Sinus rhythm with premature atrial complexes  Left axis deviation  Anterolateral infarct , age undetermined  No previous ECGs available     CBC WITH AUTOMATED DIFF    Collection Time: 05/09/22  5:14 PM   Result Value Ref Range    WBC 6.5 3.6 - 11.0 K/uL    RBC 4.00 3.80 - 5.20 M/uL    HGB 12.7 11.5 - 16.0 g/dL    HCT 40.0 35.0 - 47.0 %    .0 (H) 80.0 - 99.0 FL    MCH 31.8 26.0 - 34.0 PG    MCHC 31.8 30.0 - 36.5 g/dL    RDW 13.0 11.5 - 14.5 %    PLATELET 261 143 - 904 K/uL    MPV 8.7 (L) 8.9 - 12.9 FL    NRBC 0.0 0  WBC    ABSOLUTE NRBC 0.00 0.00 - 0.01 K/uL    NEUTROPHILS 53 32 - 75 %    BAND NEUTROPHILS 3 %    LYMPHOCYTES 35 12 - 49 %    MONOCYTES 4 (L) 5 - 13 %    EOSINOPHILS 0 0 - 7 %    BASOPHILS 1 0 - 1 %    METAMYELOCYTES 2 %    MYELOCYTES 2 %    IMMATURE GRANULOCYTES 0 0.0 - 0.5 %    ABS. NEUTROPHILS 3.6 1.8 - 8.0 K/UL    ABS. LYMPHOCYTES 2.3 0.8 - 3.5 K/UL    ABS. MONOCYTES 0.3 0.0 - 1.0 K/UL    ABS. EOSINOPHILS 0.0 0.0 - 0.4 K/UL    ABS. BASOPHILS 0.1 0.0 - 0.1 K/UL    ABS. IMM.  GRANS. 0.0 0.00 - 0.04 K/UL    DF MANUAL      RBC COMMENTS MACROCYTOSIS  1+        WBC COMMENTS SMUDGE CELLS     METABOLIC PANEL, COMPREHENSIVE    Collection Time: 05/09/22  5:14 PM   Result Value Ref Range    Sodium 136 136 - 145 mmol/L    Potassium 4.0 3.5 - 5.1 mmol/L    Chloride 102 97 - 108 mmol/L    CO2 29 21 - 32 mmol/L    Anion gap 5 5 - 15 mmol/L    Glucose 145 (H) 65 - 100 mg/dL    BUN 12 6 - 20 MG/DL    Creatinine 0.56 0.55 - 1.02 MG/DL    BUN/Creatinine ratio 21 (H) 12 - 20      GFR est AA >60 >60 ml/min/1.73m2    GFR est non-AA >60 >60 ml/min/1.73m2    Calcium 9.6 8.5 - 10.1 MG/DL    Bilirubin, total 0.4 0.2 - 1.0 MG/DL    ALT (SGPT) 24 12 - 78 U/L    AST (SGOT) 10 (L) 15 - 37 U/L    Alk. phosphatase 150 (H) 45 - 117 U/L    Protein, total 6.7 6.4 - 8.2 g/dL    Albumin 2.6 (L) 3.5 - 5.0 g/dL    Globulin 4.1 (H) 2.0 - 4.0 g/dL    A-G Ratio 0.6 (L) 1.1 - 2.2     TROPONIN-HIGH SENSITIVITY    Collection Time: 05/09/22  5:14 PM   Result Value Ref Range    Troponin-High Sensitivity 11 0 - 51 ng/L   NT-PRO BNP    Collection Time: 05/09/22  5:14 PM   Result Value Ref Range    NT pro- (H) <450 PG/ML   D DIMER    Collection Time: 05/09/22  5:53 PM   Result Value Ref Range    D-dimer 2.03 (H) 0.00 - 0.65 mg/L FEU   COVID-19 RAPID TEST    Collection Time: 05/09/22  5:53 PM   Result Value Ref Range    Specimen source Nasopharyngeal      COVID-19 rapid test Not detected NOTD     POC LACTIC ACID    Collection Time: 05/09/22  6:01 PM   Result Value Ref Range    Lactic Acid (POC) 1.23 0.40 - 2.00 mmol/L       Radiologic Studies -   DUPLEX LOWER EXT VENOUS BILAT   Final Result      CTA CHEST W OR W WO CONT   Final Result   1. No evidence pulmonary embolism. 2. Small right pleural effusion. Bibasilar patchy airspace consolidation. 3. Several nonspecific right lung subcentimeter nodular densities. Consider   short interval follow-up. 4. Moderate-sized hiatal hernia. CT PELV WO CONT   Final Result   No acute fracture visualized. If clinical symptoms persist, MR can   be performed for further evaluation. XR KNEE RT MAX 2 VWS   Final Result   No acute fracture or dislocation. XR CHEST PORT   Final Result   1. No radiographic evidence of acute cardiopulmonary disease.                  CT Results  (Last 48 hours)    None        CXR Results  (Last 48 hours) 05/09/22 1709  XR CHEST PORT Final result    Impression:  1. No radiographic evidence of acute cardiopulmonary disease. Narrative:  INDICATION: . Shortness of breath   Additional history:   COMPARISON: Previous chest xray, 11/25/2019. LIMITATIONS: Portable technique. Landon Torres FINDINGS: Single frontal view of the chest.    .   Lines/tubes/surgical: None. Heart/mediastinum: Calcifications in the aortic arch. Lungs/pleura: Chronic appearing lung changes without definite acute process. No   visualized pleural effusion or pneumothorax. Additional Comments: Sequens overlie the chest. Degenerative changes in both   shoulders. .             Medical Decision Making   I am the first provider for this patient. I reviewed the vital signs, available nursing notes, past medical history, past surgical history, family history and social history. Vital Signs-Reviewed the patient's vital signs. Patient Vitals for the past 12 hrs:   Temp Pulse Resp BP SpO2   05/09/22 1730 -- 74 19 (!) 164/74 92 %   05/09/22 1643 97.8 °F (36.6 °C) 79 28 (!) 173/68 92 %       EKG interpretation: (Preliminary)  Rhythm: normal sinus rhythm; and regular . Rate (approx.): 82; Axis: left axis deviation; WV interval: normal; QRS interval: normal ; ST/T wave: non-specific changes; Other findings: PACs. Records Reviewed: Nursing Notes, Old Medical Records, Previous Radiology Studies and Previous Laboratory Studies    Provider Notes (Medical Decision Making):   Pneumonia, 23, pulmonary embolism, peripheral edema, fracture, sprain, contusion, dehydration, electrolyte abnormality    ED Course:   Initial assessment performed. The patients presenting problems have been discussed, and they are in agreement with the care plan formulated and outlined with them. I have encouraged them to ask questions as they arise throughout their visit.     So note:      Patient is being admitted by the hospitalist, Dr. Alek Mayo Critical Care Time:   0    Disposition:  admit    DISCHARGE PLAN:  1. Current Discharge Medication List        2. Follow-up Information    None       3. Return to ED if worse     Diagnosis     Clinical Impression:   1. Community acquired pneumonia, unspecified laterality    2. Generalized weakness    3. Right hip pain    4. SOB (shortness of breath)        Attestations:    Susan Torres MD    Please note that this dictation was completed with [employer], the computer voice recognition software. Quite often unanticipated grammatical, syntax, homophones, and other interpretive errors are inadvertently transcribed by the computer software. Please disregard these errors. Please excuse any errors that have escaped final proofreading. Thank you.

## 2022-05-10 ENCOUNTER — APPOINTMENT (OUTPATIENT)
Dept: CT IMAGING | Age: 87
DRG: 203 | End: 2022-05-10
Attending: STUDENT IN AN ORGANIZED HEALTH CARE EDUCATION/TRAINING PROGRAM
Payer: MEDICARE

## 2022-05-10 LAB
ANION GAP SERPL CALC-SCNC: 4 MMOL/L (ref 5–15)
ATRIAL RATE: 82 BPM
BUN SERPL-MCNC: 10 MG/DL (ref 6–20)
BUN/CREAT SERPL: 21 (ref 12–20)
CALCIUM SERPL-MCNC: 8.8 MG/DL (ref 8.5–10.1)
CALCULATED P AXIS, ECG09: 49 DEGREES
CALCULATED R AXIS, ECG10: -46 DEGREES
CALCULATED T AXIS, ECG11: 68 DEGREES
CHLORIDE SERPL-SCNC: 101 MMOL/L (ref 97–108)
CO2 SERPL-SCNC: 28 MMOL/L (ref 21–32)
CREAT SERPL-MCNC: 0.48 MG/DL (ref 0.55–1.02)
DIAGNOSIS, 93000: NORMAL
ERYTHROCYTE [DISTWIDTH] IN BLOOD BY AUTOMATED COUNT: 12.8 % (ref 11.5–14.5)
GLUCOSE SERPL-MCNC: 119 MG/DL (ref 65–100)
HCT VFR BLD AUTO: 33.5 % (ref 35–47)
HGB BLD-MCNC: 10.7 G/DL (ref 11.5–16)
MCH RBC QN AUTO: 31.6 PG (ref 26–34)
MCHC RBC AUTO-ENTMCNC: 31.9 G/DL (ref 30–36.5)
MCV RBC AUTO: 98.8 FL (ref 80–99)
NRBC # BLD: 0 K/UL (ref 0–0.01)
NRBC BLD-RTO: 0 PER 100 WBC
P-R INTERVAL, ECG05: 194 MS
PLATELET # BLD AUTO: 257 K/UL (ref 150–400)
PMV BLD AUTO: 8.4 FL (ref 8.9–12.9)
POTASSIUM SERPL-SCNC: 3.8 MMOL/L (ref 3.5–5.1)
PROCALCITONIN SERPL-MCNC: <0.05 NG/ML
Q-T INTERVAL, ECG07: 344 MS
QRS DURATION, ECG06: 116 MS
QTC CALCULATION (BEZET), ECG08: 401 MS
RBC # BLD AUTO: 3.39 M/UL (ref 3.8–5.2)
SODIUM SERPL-SCNC: 133 MMOL/L (ref 136–145)
VENTRICULAR RATE, ECG03: 82 BPM
WBC # BLD AUTO: 5.7 K/UL (ref 3.6–11)

## 2022-05-10 PROCEDURE — 74011250636 HC RX REV CODE- 250/636: Performed by: STUDENT IN AN ORGANIZED HEALTH CARE EDUCATION/TRAINING PROGRAM

## 2022-05-10 PROCEDURE — 97166 OT EVAL MOD COMPLEX 45 MIN: CPT | Performed by: OCCUPATIONAL THERAPIST

## 2022-05-10 PROCEDURE — 84145 PROCALCITONIN (PCT): CPT

## 2022-05-10 PROCEDURE — 97530 THERAPEUTIC ACTIVITIES: CPT | Performed by: OCCUPATIONAL THERAPIST

## 2022-05-10 PROCEDURE — 97530 THERAPEUTIC ACTIVITIES: CPT

## 2022-05-10 PROCEDURE — 65270000046 HC RM TELEMETRY

## 2022-05-10 PROCEDURE — 77030029684 HC NEB SM VOL KT MONA -A

## 2022-05-10 PROCEDURE — 36415 COLL VENOUS BLD VENIPUNCTURE: CPT

## 2022-05-10 PROCEDURE — 74011250637 HC RX REV CODE- 250/637: Performed by: STUDENT IN AN ORGANIZED HEALTH CARE EDUCATION/TRAINING PROGRAM

## 2022-05-10 PROCEDURE — 94760 N-INVAS EAR/PLS OXIMETRY 1: CPT

## 2022-05-10 PROCEDURE — 74011250637 HC RX REV CODE- 250/637: Performed by: NURSE PRACTITIONER

## 2022-05-10 PROCEDURE — 77010033678 HC OXYGEN DAILY

## 2022-05-10 PROCEDURE — 94640 AIRWAY INHALATION TREATMENT: CPT

## 2022-05-10 PROCEDURE — 97161 PT EVAL LOW COMPLEX 20 MIN: CPT

## 2022-05-10 PROCEDURE — 74011000250 HC RX REV CODE- 250: Performed by: STUDENT IN AN ORGANIZED HEALTH CARE EDUCATION/TRAINING PROGRAM

## 2022-05-10 PROCEDURE — 74011250637 HC RX REV CODE- 250/637: Performed by: INTERNAL MEDICINE

## 2022-05-10 PROCEDURE — 85027 COMPLETE CBC AUTOMATED: CPT

## 2022-05-10 PROCEDURE — 80048 BASIC METABOLIC PNL TOTAL CA: CPT

## 2022-05-10 PROCEDURE — 97535 SELF CARE MNGMENT TRAINING: CPT | Performed by: OCCUPATIONAL THERAPIST

## 2022-05-10 PROCEDURE — 74011000250 HC RX REV CODE- 250: Performed by: NURSE PRACTITIONER

## 2022-05-10 PROCEDURE — 74011250636 HC RX REV CODE- 250/636: Performed by: INTERNAL MEDICINE

## 2022-05-10 RX ORDER — LORAZEPAM 2 MG/ML
0.5 INJECTION INTRAMUSCULAR
Status: DISCONTINUED | OUTPATIENT
Start: 2022-05-10 | End: 2022-05-10

## 2022-05-10 RX ORDER — SODIUM CHLORIDE 9 MG/ML
75 INJECTION, SOLUTION INTRAVENOUS CONTINUOUS
Status: DISCONTINUED | OUTPATIENT
Start: 2022-05-10 | End: 2022-05-11

## 2022-05-10 RX ORDER — IPRATROPIUM BROMIDE AND ALBUTEROL SULFATE 2.5; .5 MG/3ML; MG/3ML
3 SOLUTION RESPIRATORY (INHALATION)
Status: DISCONTINUED | OUTPATIENT
Start: 2022-05-10 | End: 2022-05-17 | Stop reason: HOSPADM

## 2022-05-10 RX ORDER — ONDANSETRON 4 MG/1
4 TABLET, ORALLY DISINTEGRATING ORAL ONCE
Status: COMPLETED | OUTPATIENT
Start: 2022-05-10 | End: 2022-05-10

## 2022-05-10 RX ORDER — TRAMADOL HYDROCHLORIDE 50 MG/1
50 TABLET ORAL
Status: DISCONTINUED | OUTPATIENT
Start: 2022-05-10 | End: 2022-05-17 | Stop reason: HOSPADM

## 2022-05-10 RX ORDER — CLONIDINE HYDROCHLORIDE 0.1 MG/1
0.2 TABLET ORAL
Status: DISCONTINUED | OUTPATIENT
Start: 2022-05-10 | End: 2022-05-10

## 2022-05-10 RX ORDER — HYDRALAZINE HYDROCHLORIDE 20 MG/ML
10 INJECTION INTRAMUSCULAR; INTRAVENOUS
Status: DISCONTINUED | OUTPATIENT
Start: 2022-05-10 | End: 2022-05-17 | Stop reason: HOSPADM

## 2022-05-10 RX ADMIN — ONDANSETRON 4 MG: 4 TABLET, ORALLY DISINTEGRATING ORAL at 03:26

## 2022-05-10 RX ADMIN — LEVOFLOXACIN 750 MG: 5 INJECTION, SOLUTION INTRAVENOUS at 00:35

## 2022-05-10 RX ADMIN — SODIUM CHLORIDE 75 ML/HR: 9 INJECTION, SOLUTION INTRAVENOUS at 08:26

## 2022-05-10 RX ADMIN — TRAMADOL HYDROCHLORIDE 50 MG: 50 TABLET ORAL at 03:26

## 2022-05-10 RX ADMIN — PANTOPRAZOLE SODIUM 40 MG: 40 TABLET, DELAYED RELEASE ORAL at 08:32

## 2022-05-10 RX ADMIN — SODIUM CHLORIDE, PRESERVATIVE FREE 10 ML: 5 INJECTION INTRAVENOUS at 18:11

## 2022-05-10 RX ADMIN — ONDANSETRON 4 MG: 4 TABLET, ORALLY DISINTEGRATING ORAL at 08:20

## 2022-05-10 RX ADMIN — IPRATROPIUM BROMIDE AND ALBUTEROL SULFATE 3 ML: .5; 3 SOLUTION RESPIRATORY (INHALATION) at 04:31

## 2022-05-10 RX ADMIN — TRAMADOL HYDROCHLORIDE 50 MG: 50 TABLET ORAL at 08:02

## 2022-05-10 RX ADMIN — LATANOPROST 1 DROP: 50 SOLUTION OPHTHALMIC at 06:16

## 2022-05-10 RX ADMIN — ENOXAPARIN SODIUM 40 MG: 100 INJECTION SUBCUTANEOUS at 08:31

## 2022-05-10 RX ADMIN — CLONIDINE HYDROCHLORIDE 0.2 MG: 0.1 TABLET ORAL at 04:21

## 2022-05-10 RX ADMIN — LATANOPROST 1 DROP: 50 SOLUTION OPHTHALMIC at 21:50

## 2022-05-10 RX ADMIN — ATORVASTATIN CALCIUM 10 MG: 10 TABLET, FILM COATED ORAL at 00:41

## 2022-05-10 RX ADMIN — SODIUM CHLORIDE, PRESERVATIVE FREE 10 ML: 5 INJECTION INTRAVENOUS at 21:50

## 2022-05-10 RX ADMIN — ATORVASTATIN CALCIUM 10 MG: 10 TABLET, FILM COATED ORAL at 21:49

## 2022-05-10 NOTE — PROGRESS NOTES
Hospitalist Progress Note    NAME: Alonso Talavera   :  1928   MRN:  [id number]       Assessment / Plan:  Community-acquired pneumonia  Recent fall  Deconditioning     CTA chest  1. No evidence pulmonary embolism. 2. Small right pleural effusion. Bibasilar patchy airspace consolidation. 3. Several nonspecific right lung subcentimeter nodular densities. Consider  short interval follow-up. 4. Moderate-sized hiatal hernia.     DC abx as PCT negative. Levaquin also not ideal in elderly patients  COVID-19 negative  Continue to have right hip pain and right shoulder pain and weakness from recent fall. CT pelvis negative for fracture  x-ray right shoulder No acute abnormality. Osteopenia and degenerative changes  CT head was ordered, but do not feel it is necessary at this time as patient is mentating and she would need a sedative to undergo this test which is not ideal in this 81 yo  Doppler negative for DVT  PT OT consulted - recommending SNF  CM consulted, daughter requesting for SNF placement     CAD  GERD  Hypothyroidism  Resume home meds     Hard of hearing     Code Status: DNR, confirmed by daughter at the bedside  Surrogate Decision Maker: Her son and daughter     DVT Prophylaxis: Lovenox  GI Prophylaxis: not indicated     Baseline: Was able to ambulate with a walker prior to last week. 18.5 - 24.9 Normal weight / Body mass index is 24.66 kg/m². Estimated discharge date: May 13  Barriers: Clinical improvement    Recommended Disposition: SNF/LTC     Subjective:     Chief Complaint / Reason for Physician Visit  Patient seen and examined the bedside. She was having some nausea this morning. She is currently resting at the time of my arrival to her room. She states that she feels more sore than actual pain. She was complaining of some bilateral hand numbness and tingling this morning, but she says that this has been persistent on and off for the past 3 years or so.   Discussed with RN events overnight. Review of Systems:  Symptom Y/N Comments  Symptom Y/N Comments   Fever/Chills    Chest Pain     Poor Appetite    Edema     Cough    Abdominal Pain     Sputum    Joint Pain     SOB/GUTHRIE    Pruritis/Rash     Nausea/vomit    Tolerating PT/OT     Diarrhea    Tolerating Diet     Constipation    Other       Could NOT obtain due to:      Objective:     VITALS:   Last 24hrs VS reviewed since prior progress note. Most recent are:  Patient Vitals for the past 24 hrs:   Temp Pulse Resp BP SpO2   05/10/22 1113 97.3 °F (36.3 °C) 63 18 (!) 116/59 99 %   05/10/22 0803 97.9 °F (36.6 °C) 71 18 132/62 91 %   05/10/22 0500 97.3 °F (36.3 °C) 74 18 122/65 --   05/10/22 0429 -- -- -- -- 91 %   05/10/22 0200 98.1 °F (36.7 °C) 77 19 (!) 195/56 90 %   05/10/22 0002 -- 66 16 (!) 155/65 91 %   05/09/22 2347 -- 74 19 (!) 156/67 90 %   05/09/22 2332 -- 62 16 (!) 145/56 93 %   05/09/22 2315 -- 64 15 (!) 157/63 95 %   05/09/22 2302 -- 71 20 (!) 170/81 95 %   05/09/22 2125 -- 71 23 (!) 160/83 96 %   05/09/22 1845 -- -- -- -- 90 %   05/09/22 1830 -- 71 17 (!) 177/79 91 %   05/09/22 1730 -- 74 19 (!) 164/74 92 %   05/09/22 1643 97.8 °F (36.6 °C) 79 28 (!) 173/68 92 %     No intake or output data in the 24 hours ending 05/10/22 1337     I had a face to face encounter and independently examined this patient on 5/10/2022, as outlined below:  PHYSICAL EXAM:  General: WD, WN. Alert, cooperative, no acute distress, frail appearing   EENT:  EOMI. Anicteric sclerae. MMM  Resp:  CTA bilaterally, no wheezing or rales. No accessory muscle use  CV:  Regular  rhythm,  No edema  GI:  Soft, Non distended, Non tender. +Bowel sounds  Neurologic:  Alert and oriented X 3, normal speech,   Psych:   Good insight. Not anxious nor agitated  Skin:  No rashes.   No jaundice    Reviewed most current lab test results and cultures  YES  Reviewed most current radiology test results   YES  Review and summation of old records today    NO  Reviewed patient's current orders and MAR    YES  PMH/SH reviewed - no change compared to H&P  ________________________________________________________________________  Care Plan discussed with:    Comments   Patient     Family      RN     Care Manager     Consultant                        Multidiciplinary team rounds were held today with , nursing, pharmacist and clinical coordinator. Patient's plan of care was discussed; medications were reviewed and discharge planning was addressed. ________________________________________________________________________  Total NON critical care TIME:  36   Minutes    Total CRITICAL CARE TIME Spent:   Minutes non procedure based      Comments   >50% of visit spent in counseling and coordination of care     ________________________________________________________________________  Camille Vasquez MD     Procedures: see electronic medical records for all procedures/Xrays and details which were not copied into this note but were reviewed prior to creation of Plan. LABS:  I reviewed today's most current labs and imaging studies.   Pertinent labs include:  Recent Labs     05/10/22  0910 05/09/22  1714   WBC 5.7 6.5   HGB 10.7* 12.7   HCT 33.5* 40.0    282     Recent Labs     05/10/22  0910 05/09/22  1714   * 136   K 3.8 4.0    102   CO2 28 29   * 145*   BUN 10 12   CREA 0.48* 0.56   CA 8.8 9.6   ALB  --  2.6*   TBILI  --  0.4   ALT  --  24       Signed: Camille Vasquez MD

## 2022-05-10 NOTE — ED NOTES
Spoke with nursing supervisor, pt's daughter can stay overnight with her due to her concern for her mother's ability to hear and care she can assist with

## 2022-05-10 NOTE — PROGRESS NOTES
Transition of Care Plan:    RUR: 13% - \"low risk\"  Disposition: SNF placement (pending acceptance)  Follow up appointments: PCP & specialist as indicated  DME needed: Defer to SNF for DME needs  Transportation at Discharge: BLS transport  [address] or means to access home: N/A - pt transitioning to SNF at d/c       IM Medicare Letter: 2nd IM needed prior to d/c  Is patient a BCPI-A Bundle: N/A           Is patient a  and connected with the [place]? N/A              Caregiver Contact: Pt's son Candace Batista - cell: [phone], home: [phone])  Discharge Caregiver contacted prior to discharge? To be contacted prior to d/c  Care Conference needed?: N/A                 Initial note: Chart reviewed. Consult for SNF placement acknowledged. CM met with pt and daughter Nelson Dunne) at bedside, introduced role as d/c planner, and confirmed demographic information is up-to-date in the chart. CM noted pt is currently on O2 (3L); daughter confirmed pt is not on O2 at baseline. Per daughter, pt recently d/c from the E.D with 24/7 care provided by her and her brother Candace Batista). Per daughter, neither her or her brother are able to manage pt's care needs at this time. Daughter inquired about placement at [facility]. CM explained the difference between I[facility] vs SNF and addressed f/u questions as needed. CM explained pt would not qualify for IPR at this time due to current level of function. Daughter agreeable to utilizing SNF intervention at d/c. Daughter provided with list of SNF's for review; daughter to review list of SNF's with her siblings this evening & provide 3 options for placement by 5/11/22. CM briefly discussed plan for after SNF. CM discussed Medicaid/LTC screening & explained services associated with coverage. Per daughter, pt is over-income for Medicaid at this time. Pt would have to complete a spend-down prior to qualifying for Medicaid/LTC coverage. No immediate questions/concerns identified.  Full assessment detailed below:    Reason for Admission: CAP                    RUR Score: 13% - \"low risk\"                   Plan for utilizing home health: PT/OT consulted; recommendations for SNF acknowledged. Per daughter, pt has required significant assistance with all ADL's the last week or so. Daughter reported pt is currently unable to ambulate without significant assistance. Per daughter, pt has access to DME in the form of a walker, rollator, shower chair, and recliner that assist with transitioning pt from sitting to standing. Pt has prior hx of utilizing HH & IPR interventions; no prior hx reported utilizing SNF intervention         PCP: First and Last name:  Tarsha Torres MD   Name of Practice: [address] [place] [address] [facility]    Are you a current patient: Yes/No: Yes   Approximate date of last visit: 5/9/22   Can you participate in a virtual visit with your PCP: in-person visits preferred                    Current Advanced Directive/Advance Care Plan: DNR    Advance Care Planning     General Advance Care Planning (ACP) Conversation      Date of Conversation: 5/9/2022  Conducted with: Patient with Decision Making Capacity    Content/Action Overview: Has ACP document(s) NOT on file - requested patient to provide  Reviewed DNR/DNI and patient confirms current DNR status - completed forms on file (place new order if needed)    Length of Voluntary ACP Conversation in minutes:  <16 minutes (Non-Billable)    Care Management Interventions  PCP Verified by CM:  Yes  Last Visit to PCP: 05/09/22  Palliative Care Criteria Met (RRAT>21 & CHF Dx)?: No  Mode of Transport at Discharge: BLS  Transition of Care Consult (CM Consult): SNF  Discharge Durable Medical Equipment: No  Physical Therapy Consult: Yes  Occupational Therapy Consult: Yes  Speech Therapy Consult: Yes  Support Systems: Child(deepti) (Pt lives alone in a single story home with 3 IBRAHIMA)  Confirm Follow Up Transport: Family  The Plan for Transition of Care is Related to the Following Treatment Goals : SNF  The Patient and/or Patient Representative was Provided with a Choice of Provider and Agrees with the Discharge Plan?: Yes  Name of the Patient Representative Who was Provided with a Choice of Provider and Agrees with the Discharge Plan: patient and children  Freedom of Choice List was Provided with Basic Dialogue that Supports the Patient's Individualized Plan of Care/Goals, Treatment Preferences and Shares the Quality Data Associated with the Providers?: Yes  [place] Resource Information Provided?: No  Discharge Location  Patient Expects to be Discharged to[de-identified] [address]  [phone]

## 2022-05-10 NOTE — PROGRESS NOTES
End of Shift Note    Bedside shift change report given to Toney Dykes RN (oncoming nurse) by Krishna Vega RN (offgoing nurse). Report included the following information SBAR, Kardex, ED Summary and MAR    Shift worked:  [address]    Shift summary and any significant changes:     Pt alert and oriented with severe [place]. Pt reporting pain, with nausea and vomiting at change of shift. Transportation came to take pt to CT of head - pt refused, and stated that she would need something for nerves. RN notified Graciela Melgoza MD, orders received for fluids and zofran OTD. RN also gave PRN Tramdol at [phone]. RN received a call from tele reporting, \" pt christos down to 30's and then came right back up\". Pt in room sleeping with family at bedside, pt wakes easily with no s/s reported. RN notified Graciela Melgoza MD at [phone], no orders at this time. Pt resting comfortably. [id number] received call from tele, pt christos down to 30's. RN at the bedside to obtain vitals, pt alert and oriented, answering questions appropriately, denies all s/s, hr 60's - 50's, /87, O2 92 on 3LNC, resp 18.         Concerns for physician to address:  see above    Zone phone for oncoming shift:   [phone]     Patient Information  Alline Chain  80 y.o.  5/9/2022  4:47 PM by Nicolasa Sims MD. Rhina Son was admitted from Home    Problem List  Patient Active Problem List    Diagnosis Date Noted    CAP (community acquired pneumonia) 05/09/2022     Past Medical History:   Diagnosis Date    Arthritis     Breast cancer ([facility] [address] 1998    right breast    CAD (coronary artery disease)     2 heart attacks    Cancer ([facility] [address]     breast - treated with surgery    GERD (gastroesophageal reflux disease)     hiatal hernia    Other ill-defined conditions(799.89)     increased cholesterol    PUD (peptic ulcer disease)     Thyroid disease        Core Measures:  CVA: No Not applicable  CHF:No Not applicable  PNA:Yes Yes    Activity:  Activity Level: Bed Rest  Number times ambulated in hallways past shift: 0  Number of times OOB to chair past shift: 0    Cardiac:   Cardiac Monitoring: Yes           Access:   Current line(s): PIV   Central Line? No Placement date   PICC LINE? No Placement date     Genitourinary:   Urinary status: voiding, incontinent and external catheter  Urinary Catheter? No Placement Date     Respiratory:   O2 Device: Nasal cannula  Chronic home O2 use?: NO  Incentive spirometer at bedside: N/A       GI:  Last Bowel Movement Date: 05/08/22  Current diet:  ADULT DIET Regular; Low Fat/Low Chol/High Fiber/ARMINDA  Passing flatus: YES  Tolerating current diet: YES       Pain Management:   Patient states pain is manageable on current regimen: YES    Skin:  Elliot Score: 15  Interventions: turn team, speciality bed, float heels and increase time out of bed    Patient Safety:  Fall Score:  Total Score: 4  Interventions: bed/chair alarm, gripper socks, pt to call before getting OOB and stay with me (per policy)  High Fall Risk: Yes  @Rollbelt  @dexterity to release roll belt  Yes/No ( must document dexterity  here by stating Yes or No here, otherwise this is a restraint and must follow restraint documentation policy.)    DVT prophylaxis:  DVT prophylaxis Med- No  DVT prophylaxis SCD or ORA- Yes     Wounds: (If Applicable)  Wounds- Yes  Location- [facility]     Active Consults:  None    Length of Stay:  Expected LOS: 2d 12h  Actual LOS: 1  Discharge Plan: Yes [address], RN

## 2022-05-10 NOTE — PROGRESS NOTES
End of Shift Note    Bedside shift change report given to ,RN (oncoming nurse) by Kenia Ellsworth RN (offgoing nurse). Report included the following information SBAR, Kardex, ED Summary and MAR    Shift worked:  [address]    Shift summary and any significant changes:     Pt alert and oriented with severe [place]. Pt reporting pain, with nausea and vomiting at change of shift. Transportation came to take pt to CT of head - pt refused, and stated that she would need something for nerves. RN notified Azul Preston MD, orders received for fluids and zofran OTD. RN also gave PRN Tramdol at [phone]. RN received a call from tele reporting, \" pt christos down to 30's and then came right back up\". Pt in room sleeping with family at bedside, pt wakes easily with no s/s reported. RN notified Azul Preston MD at [phone], no orders at this time. Pt resting comfortably. Concerns for physician to address:  see above    Zone phone for oncoming shift:   [phone]     Patient Information  Lakisha Guzman  80 y.o.  5/9/2022  4:47 PM by Virginia Hawk MD. Lakisha Guzman was admitted from Home    Problem List  Patient Active Problem List    Diagnosis Date Noted    CAP (community acquired pneumonia) 05/09/2022     Past Medical History:   Diagnosis Date    Arthritis     Breast cancer ([facility] [address] 1998    right breast    CAD (coronary artery disease)     2 heart attacks    Cancer ([facility] [address]     breast - treated with surgery    GERD (gastroesophageal reflux disease)     hiatal hernia    Other ill-defined conditions(927.02)     increased cholesterol    PUD (peptic ulcer disease)     Thyroid disease        Core Measures:  CVA: No Not applicable  CHF:No Not applicable  PNA:Yes Yes    Activity:  Activity Level: Bed Rest  Number times ambulated in hallways past shift: 0  Number of times OOB to chair past shift: 0    Cardiac:   Cardiac Monitoring: Yes           Access:   Current line(s): PIV   Central Line? No Placement date   PICC LINE?  No Placement date Genitourinary:   Urinary status: voiding, incontinent and external catheter  Urinary Catheter? No Placement Date     Respiratory:   O2 Device: Nasal cannula  Chronic home O2 use?: NO  Incentive spirometer at bedside: N/A       GI:  Last Bowel Movement Date: 05/08/22  Current diet:  ADULT DIET Regular; Low Fat/Low Chol/High Fiber/ARMINDA  Passing flatus: YES  Tolerating current diet: YES       Pain Management:   Patient states pain is manageable on current regimen: YES    Skin:  Elliot Score: 15  Interventions: turn team, speciality bed, float heels and increase time out of bed    Patient Safety:  Fall Score:  Total Score: 4  Interventions: bed/chair alarm, gripper socks, pt to call before getting OOB and stay with me (per policy)  High Fall Risk: Yes  @Rollbelt  @dexterity to release roll belt  Yes/No ( must document dexterity  here by stating Yes or No here, otherwise this is a restraint and must follow restraint documentation policy.)    DVT prophylaxis:  DVT prophylaxis Med- No  DVT prophylaxis SCD or ORA- Yes     Wounds: (If Applicable)  Wounds- Yes  Location- [facility]     Active Consults:  None    Length of Stay:  Expected LOS: 2d 12h  Actual LOS: 1  Discharge Plan: Yes [address], RN

## 2022-05-10 NOTE — PROGRESS NOTES
Problem: Falls - Risk of  Goal: *Absence of Falls  Description: Document Cheri Quezada Fall Risk and appropriate interventions in the flowsheet. Outcome: Progressing Towards Goal  Note: Fall Risk Interventions:       Mentation Interventions: Adequate sleep, hydration, pain control,Bed/chair exit alarm    Medication Interventions: Bed/chair exit alarm,Patient to call before getting OOB    Elimination Interventions: Bed/chair exit alarm,Call light in reach    History of Falls Interventions: Bed/chair exit alarm,Door open when patient unattended         Problem: Pressure Injury - Risk of  Goal: *Prevention of pressure injury  Description: Document Elliot Scale and appropriate interventions in the flowsheet.   Outcome: Progressing Towards Goal  Note: Pressure Injury Interventions:  Sensory Interventions: Assess changes in LOC    Moisture Interventions: Absorbent underpads,Apply protective barrier, creams and emollients    Activity Interventions: PT/OT evaluation    Mobility Interventions: Assess need for specialty bed,HOB 30 degrees or less    Nutrition Interventions: Document food/fluid/supplement intake,Discuss nutritional consult with provider    Friction and Shear Interventions: Apply protective barrier, creams and emollients,Feet elevated on foot rest                Problem: General Medical Care Plan  Goal: *Vital signs within specified parameters  Outcome: Progressing Towards Goal  Goal: *Labs within defined limits  Outcome: Progressing Towards Goal  Goal: *Absence of infection signs and symptoms  Outcome: Progressing Towards Goal  Goal: *Optimal pain control at patient's stated goal  Outcome: Progressing Towards Goal  Goal: *Skin integrity maintained  Outcome: Progressing Towards Goal  Goal: *Fluid volume balance  Outcome: Progressing Towards Goal  Goal: *Optimize nutritional status  Outcome: Progressing Towards Goal  Goal: *Anxiety reduced or absent  Outcome: Progressing Towards Goal  Goal: *Progressive mobility and function (eg: ADL's)  Outcome: Progressing Towards Goal

## 2022-05-10 NOTE — ROUTINE PROCESS
ADULT PROTOCOL: JET AEROSOL ASSESSMENT    Patient  Tacos Mart     80 y.o.   female     5/10/2022  5:03 AM    Breath Sounds Pre Procedure: Right Breath Sounds: Expiratory wheezing                               Left Breath Sounds: Expiratory wheezing    Breath Sounds Post Procedure: Right Breath Sounds: Expiratory wheezing                                 Left Breath Sounds: Expiratory wheezing    Breathing pattern: Pre procedure Breathing Pattern: Regular          Post procedure Breathing Pattern: Regular    Heart Rate: Pre procedure Pulse: 68           Post procedure Pulse: 63    Resp Rate: Pre procedure Respirations: 20           Post procedure Respirations: 20    Oxygen: O2 Device: Nasal cannula   Flow rate (L/min) 30     Changed: NO    SpO2: Pre procedure SpO2: 91 %   with oxygen              Post procedure SpO2: 95 %  with oxygen    Nebulizer Therapy: Current medications Aerosolized Medications: Duoneb      Changed: NO    Smoking History: Never Smoked  Problem List:   Patient Active Problem List   Diagnosis Code    CAP (community acquired pneumonia) J18.9       Respiratory Therapist: Libby Harada, RT

## 2022-05-10 NOTE — H&P
Hospitalist Admission Note    NAME: Josh Spatz   :  1928   MRN:  [id number]     Date/Time:  2022 11:17 PM    Patient PCP: Truman Davis MD  ______________________________________________________________________  Given the patient's current clinical presentation, I have a high level of concern for decompensation if discharged from the emergency department. Complex decision making was performed, which includes reviewing the patient's available past medical records, laboratory results, and x-ray films. My assessment of this patient's clinical condition and my plan of care is as follows. Assessment / Plan:  Community-acquired pneumonia  Recent fall  Deconditioning    CTA chest  1. No evidence pulmonary embolism. 2. Small right pleural effusion. Bibasilar patchy airspace consolidation. 3. Several nonspecific right lung subcentimeter nodular densities. Consider  short interval follow-up. 4. Moderate-sized hiatal hernia.     Will cover with IV antibiotics for now, follow-up Pro-Tito  COVID-19 negative  Continue to have right hip pain and right shoulder pain and weakness from recent fall. CT pelvis negative for fracture, will do x-ray right shoulder and CT head  Doppler negative for DVT  PT OT consult   CM consulted, daughter requesting for SNF placement    CAD  GERD  Hypothyroidism  Resume home meds    Hard of hearing    Code Status: DNR, confirmed by daughter at the bedside  Surrogate Decision Maker: Her son and daughter    DVT Prophylaxis: Lovenox  GI Prophylaxis: not indicated    Baseline: Was able to ambulate with a walker prior to last week. Subjective:   CHIEF COMPLAINT: Cough, shortness of breath    HISTORY OF PRESENT ILLNESS:     Lady Torrez is a 80 y.o. female with past medical history of CAD, breast cancer, GERD, hypothyroidism presented with cough and shortness of breath.   She started having cough with phlegm 1 week back, was treated with Zithromax as outpatient by her PMD.  However this did not relieve her symptoms and she continues to have generalized weakness. She had a fall 4 days back, when she was trying to get back to her bed she slipped and fell. She was seen in ED at that time and her imaging studies were negative. However she continues to have right hip and right shoulder pain and she has difficulty moving her right upper extremity. She denies any chest pain, belly pain, change in bladder or bowel habits. She has bilateral lower extremity edema, right greater than left. Daughter at the bedside is concerned about her care at home and requesting for SNF placement. We were asked to admit for work up and evaluation of the above problems.      Past Medical History:   Diagnosis Date    Arthritis     Breast cancer ([facility]) 1998    right breast    CAD (coronary artery disease)     2 heart attacks    Cancer (H[facility])     breast - treated with surgery    GERD (gastroesophageal reflux disease)     hiatal hernia    Other ill-defined conditions(799.89)     increased cholesterol    PUD (peptic ulcer disease)     Thyroid disease         Past Surgical History:   Procedure Laterality Date    HX CHOLECYSTECTOMY      HX DILATION AND CURETTAGE      x 3-4    HX HYSTERECTOMY      total hysterectomy    HX MASTECTOMY Right 1998    right    HX ORTHOPAEDIC  12/31/12    PATELLA OPEN REDUCTION INTERNAL FIXATION (Left       Social History     Tobacco Use    Smoking status: Former Smoker    Smokeless tobacco: Not on file    Tobacco comment: former cigarette smoker for 5 yrs/quit 15+ yrs ago   Substance Use Topics    Alcohol use: No        Family History   Problem Relation Age of Onset    Thyroid Disease Mother     Lung Disease Father     Other Daughter         \"almost lost her\" - unsure of details    Breast Cancer Daughter 55     Allergies   Allergen Reactions    Codeine Nausea and Vomiting    Penicillins Itching and Swelling     swelling at injection site Prior to Admission medications    Medication Sig Start Date End Date Taking? Authorizing Provider   acetaminophen (TYLENOL) 325 mg tablet Take 2 Tablets by mouth every six (6) hours as needed for Pain. 5/4/22   SEBLE Bourne   diclofenac (Voltaren) 1 % gel Apply 2 g to affected area four (4) times daily for 5 days. 5/4/22 5/9/22  SEBLE Bourne   naproxen (NAPROSYN) 250 mg tablet Take 500 mg by mouth two (2) times daily (with meals). Shawanda Davenport MD   omeprazole (PRILOSEC) 20 mg capsule Take 20 mg by mouth daily. Shawanda Davenport MD   cyclobenzaprine (FLEXERIL) 10 mg tablet Take 10 mg by mouth. Shawanda Davenport MD   latanoprost (XALATAN) 0.005 % ophthalmic solution Administer 1 Drop to both eyes nightly. Shawanda Davenport MD   cholecalciferol, vitamin D3, 2,000 unit tab Take  by mouth. Shawanda Davenport MD   senna-docusate (PERICOLACE) 8.6-50 mg per tablet Take 1 Tab by mouth two (2) times a day. 12/28/12   Dilcia Schofield NP   acetaminophen 650 mg Tab Take 650 mg by mouth every four (4) hours as needed for Pain. 12/28/12   Dilcia Schofield NP   furosemide (LASIX) 40 mg tablet Take 40 mg by mouth daily. Provider, Historical   levothyroxine (SYNTHROID) 100 mcg tablet Take 100 mcg by mouth daily. Provider, Historical   lovastatin (MEVACOR) 20 mg tablet Take 20 mg by mouth daily. Provider, Historical   SENNOSIDES (SENNA LAX PO) Take  by mouth as needed.       Provider, Historical       REVIEW OF SYSTEMS:       Total of 12 systems reviewed as follows:       POSITIVE= underlined text  Negative = text not underlined  General:  fever, chills, sweats, generalized weakness, weight loss/gain,      loss of appetite   Eyes:    blurred vision, eye pain, loss of vision, double vision  ENT:    rhinorrhea, pharyngitis   Respiratory:   cough, sputum production, SOB, GUTHRIE, wheezing, pleuritic pain   Cardiology:   chest pain, palpitations, orthopnea, PND, edema, syncope   Gastrointestinal:  abdominal pain , N/V, diarrhea, dysphagia, constipation, bleeding   Genitourinary:  frequency, urgency, dysuria, hematuria, incontinence   Muskuloskeletal :  arthralgia, myalgia, back pain, right shoulder pain right hip pain  Hematology:  easy bruising, nose or gum bleeding, lymphadenopathy   Dermatological: rash, ulceration, pruritis, color change / jaundice  Endocrine:   hot flashes or polydipsia   Neurological:  headache, dizziness, confusion, focal weakness, paresthesia,     Speech difficulties, memory loss, gait difficulty  Psychological: Feelings of anxiety, depression, agitation    Objective:   VITALS:    Visit Vitals  BP (!) 160/83   Pulse 71   Temp 97.8 °F (36.6 °C)   Resp 23   Ht 5' 6\" (1.676 m)   Wt 69.3 kg (152 lb 12.8 oz)   SpO2 96%   BMI 24.66 kg/m²       PHYSICAL EXAM:    General:    Alert, cooperative, no distress, appears stated age. HEENT: Atraumatic, anicteric sclerae, pink conjunctivae     No oral ulcers, mucosa moist, throat clear, dentition fair  Neck:  Supple, symmetrical,  thyroid: non tender  Lungs:   Clear to auscultation bilaterally. No Wheezing or Rhonchi. No rales. Chest wall:  No tenderness  No Accessory muscle use. Heart:   Regular  rhythm,  No  murmur   No edema  Abdomen:   Soft, non-tender. Not distended. Bowel sounds normal  Extremities: No cyanosis. No clubbing,      Skin turgor normal, Capillary refill normal, Radial dial pulse 2+  Skin:     Not pale. Not Jaundiced  No rashes   Psych:  Good insight. Not depressed. Not anxious or agitated.   Neurologic: AAO x2, power 4 out of 5 in right upper extremity, has right shoulder pain    _______________________________________________________________________  Care Plan discussed with:    Comments   Patient y    Family  y  daughter at bedside   RN y    Care Manager                    Consultant:      _______________________________________________________________________  Expected  Disposition:   Home with Family    HH/PT/OT/RN    SNF/LTC y [facility]    ________________________________________________________________________  TOTAL TIME:  38 Minutes    Critical Care Provided     Minutes non procedure based      Comments     Reviewed previous records   >50% of visit spent in counseling and coordination of care  Discussion with patient and/or family and questions answered       ________________________________________________________________________  Signed: Marquis Dudley MD    Procedures: see electronic medical records for all procedures/Xrays and details which were not copied into this note but were reviewed prior to creation of Plan. LAB DATA REVIEWED:    Recent Results (from the past 24 hour(s))   EKG, 12 LEAD, INITIAL    Collection Time: 05/09/22  4:50 PM   Result Value Ref Range    Ventricular Rate 82 BPM    Atrial Rate 82 BPM    P-R Interval 194 ms    QRS Duration 116 ms    Q-T Interval 344 ms    QTC Calculation (Bezet) 401 ms    Calculated P Axis 49 degrees    Calculated R Axis -46 degrees    Calculated T Axis 68 degrees    Diagnosis       Sinus rhythm with premature atrial complexes  Left axis deviation  Anterolateral infarct , age undetermined  No previous ECGs available     CBC WITH AUTOMATED DIFF    Collection Time: 05/09/22  5:14 PM   Result Value Ref Range    WBC 6.5 3.6 - 11.0 K/uL    RBC 4.00 3.80 - 5.20 M/uL    HGB 12.7 11.5 - 16.0 g/dL    HCT 40.0 35.0 - 47.0 %    .0 (H) 80.0 - 99.0 FL    MCH 31.8 26.0 - 34.0 PG    MCHC 31.8 30.0 - 36.5 g/dL    RDW 13.0 11.5 - 14.5 %    PLATELET 166 030 - 113 K/uL    MPV 8.7 (L) 8.9 - 12.9 FL    NRBC 0.0 0  WBC    ABSOLUTE NRBC 0.00 0.00 - 0.01 K/uL    NEUTROPHILS 53 32 - 75 %    BAND NEUTROPHILS 3 %    LYMPHOCYTES 35 12 - 49 %    MONOCYTES 4 (L) 5 - 13 %    EOSINOPHILS 0 0 - 7 %    BASOPHILS 1 0 - 1 %    METAMYELOCYTES 2 %    MYELOCYTES 2 %    IMMATURE GRANULOCYTES 0 0.0 - 0.5 %    ABS. NEUTROPHILS 3.6 1.8 - 8.0 K/UL    ABS. LYMPHOCYTES 2.3 0.8 - 3.5 K/UL    ABS.  MONOCYTES 0.3 0.0 - 1.0 K/UL    ABS. EOSINOPHILS 0.0 0.0 - 0.4 K/UL    ABS. BASOPHILS 0.1 0.0 - 0.1 K/UL    ABS. IMM. GRANS. 0.0 0.00 - 0.04 K/UL    DF MANUAL      RBC COMMENTS MACROCYTOSIS  1+        WBC COMMENTS SMUDGE CELLS     METABOLIC PANEL, COMPREHENSIVE    Collection Time: 05/09/22  5:14 PM   Result Value Ref Range    Sodium 136 136 - 145 mmol/L    Potassium 4.0 3.5 - 5.1 mmol/L    Chloride 102 97 - 108 mmol/L    CO2 29 21 - 32 mmol/L    Anion gap 5 5 - 15 mmol/L    Glucose 145 (H) 65 - 100 mg/dL    BUN 12 6 - 20 MG/DL    Creatinine 0.56 0.55 - 1.02 MG/DL    BUN/Creatinine ratio 21 (H) 12 - 20      GFR est AA >60 >60 ml/min/1.73m2    GFR est non-AA >60 >60 ml/min/1.73m2    Calcium 9.6 8.5 - 10.1 MG/DL    Bilirubin, total 0.4 0.2 - 1.0 MG/DL    ALT (SGPT) 24 12 - 78 U/L    AST (SGOT) 10 (L) 15 - 37 U/L    Alk.  phosphatase 150 (H) 45 - 117 U/L    Protein, total 6.7 6.4 - 8.2 g/dL    Albumin 2.6 (L) 3.5 - 5.0 g/dL    Globulin 4.1 (H) 2.0 - 4.0 g/dL    A-G Ratio 0.6 (L) 1.1 - 2.2     TROPONIN-HIGH SENSITIVITY    Collection Time: 05/09/22  5:14 PM   Result Value Ref Range    Troponin-High Sensitivity 11 0 - 51 ng/L   NT-PRO BNP    Collection Time: 05/09/22  5:14 PM   Result Value Ref Range    NT pro- (H) <450 PG/ML   D DIMER    Collection Time: 05/09/22  5:53 PM   Result Value Ref Range    D-dimer 2.03 (H) 0.00 - 0.65 mg/L FEU   COVID-19 RAPID TEST    Collection Time: 05/09/22  5:53 PM   Result Value Ref Range    Specimen source Nasopharyngeal      COVID-19 rapid test Not detected NOTD     POC LACTIC ACID    Collection Time: 05/09/22  6:01 PM   Result Value Ref Range    Lactic Acid (POC) 1.23 0.40 - 2.00 mmol/L

## 2022-05-10 NOTE — ED NOTES
Bedside and Verbal shift change report given to Mihaela Queen (oncoming nurse) by Phyllis Penny (offgoing nurse). Report included the following information SBAR, ED Summary, MAR and Recent Results.

## 2022-05-10 NOTE — ED NOTES
TRANSITION OF CARE - SBAR OUT    Patient is being transferred to [facility] 3 Neurology Tele, Room# 6138. Report GIVEN TO Jacobo Hernandez RN on [place] for routine progression of care. Report is consisted of the following information SBAR, ED Summary, Procedure Summary, Intake/Output, MAR, Recent Results, Med Rec Status and Cardiac Rhythm nsr. Patient transferred to receiving unit by: ed tech (RN or Tech Name)     Called outstanding consults: Yes   Collected routine labs: Yes     All current orders reviewed with accepting nurse: Yes    The following personal items will be sent with the patient during transfer to the floor:   All valuables:                          CARDIAC MONITORING ORDERED: Yes     The following CURRENT information were reported to the receiving RN:    CODE STATUS: DNR    NIH SCORE:    KT SCREENING:      NEURO ASSESSMENT:        RESTRAINTS IN USE: No      IS DOCUMENTATION COMPLETE: Yes      Vital Signs  Level of Consciousness: Alert (0) (05/09/22 2315)  Temp: 97.8 °F (36.6 °C) (05/09/22 1643)  Temp Source: Oral (05/09/22 1643)  Pulse (Heart Rate): 66 (05/10/22 0002)  Resp Rate: 16 (05/10/22 0002)  BP: (!) 155/65 (05/10/22 0002)  MAP (Monitor): 92 (05/10/22 0002)  MAP (Calculated): 95 (05/10/22 0002)  BP 1 Location: Left upper arm (05/09/22 1643)  BP 1 Method: Automatic (05/09/22 1643)  MEWS Score: 2 (05/09/22 1643)  Pain 1  Pain Scale 1: Numeric (0 - 10) (05/09/22 1643)      OXYGEN: Oxygen Therapy  O2 Device: None (Room air) (05/09/22 1643)    SUSY FALL RISK:        WOUNDS: Yes rectal area blisters    URINARY CATHETER: incontinent    LINE ACCESS:   Peripheral IV 05/09/22 Left Antecubital (Active)   Site Assessment Clean, dry, & intact 05/09/22 1717   Phlebitis Assessment 0 05/09/22 1717   Infiltration Assessment 0 05/09/22 1717   Dressing Status Clean, dry, & intact 05/09/22 1717   Dressing Type Tape;Transparent 05/09/22 1717   Hub Color/Line Status Pink;Flushed;Patent 05/09/22 1717   Action Taken Blood drawn 05/09/22 1717   Alcohol Cap Used No 05/09/22 1717        Opportunity for questions and clarification were provided.   Krystal Barlow RN

## 2022-05-10 NOTE — PROGRESS NOTES
Received notification from bedside RN about patient with regards to: persistent right hip pain, requesting medication for relief  VS: /65, HR 66, RR 16, O2 sat 91% on RA    Intervention given: Tramadol 50 mg PO PRN ordered    0354: Notified of expiratory wheezing and elevated BP, requesting PRN medication    - Duoneb PRN, Hydralazine IV PRN ordered    0410: Notified of lost IV access, unsuccessful attempt to obtain nwe access    - Clonidine PO PRN for elevated BP ordered

## 2022-05-10 NOTE — PROGRESS NOTES
Problem: Mobility Impaired (Adult and Pediatric)  Goal: *Acute Goals and Plan of Care (Insert Text)  Description: FUNCTIONAL STATUS PRIOR TO ADMISSION: Patient reports living alone with 4 local children and a neighbor available to assist. Until recent falls, she reports being able to manage basic self-care tasks. RN indicates that she has been mostly in the bed over the past week (at home). HOME SUPPORT PRIOR TO ADMISSION: Neighbor and 4 local children    Physical Therapy Goals  Initiated 5/10/2022  1. Patient will move from supine to sit and sit to supine  and roll side to side in bed with minimal assistance/contact guard assist within 7 day(s). 2.  Patient will transfer from bed to chair and chair to bed with maximal assistance using the least restrictive device within 7 day(s). 3.  Patient will perform sit to stand with maximal assistance within 7 day(s). Outcome: Progressing Towards Goal     PHYSICAL THERAPY EVALUATION  Patient: Raúl Pace (54 y.o. female)  Date: 5/10/2022  Primary Diagnosis: CAP (community acquired pneumonia) [J18.9]        Precautions:  Fall    ASSESSMENT  Patient admitted with community acquired pneumonia. Based on the objective data described below, the patient presents with deficits in balance, strength, endurance, ROM, and cognitive status. Has R hip and shoulder pain from recent fall with ROM and mobility but imaging negative for fracture. Functioning below supposed baseline. Patient is a high fall risk. Patient lives alone and would be unsafe to discharge home at this point in time. Recommend discharge to subacute rehab/SNF. Acute PT will continue to follow and progress as able. Current Level of Function Impacting Discharge (mobility/balance): maximal assist x 2 sit to supine     Other factors to consider for discharge: new oxygen requirement, lives alone     Patient will benefit from skilled therapy intervention to address the above noted impairments.        PLAN :  Recommendations and Planned Interventions: bed mobility training, transfer training, gait training, therapeutic exercises, neuromuscular re-education, patient and family training/education and therapeutic activities      Frequency/Duration: Patient will be followed by physical therapy:  3 times a week to address goals. Recommendation for discharge: (in order for the patient to meet his/her long term goals)  Therapy up to 5 days/week in SNF setting    This discharge recommendation:  Has not yet been discussed the attending provider and/or case management    IF patient discharges home will need the following DME: to be determined (TBD)     SUBJECTIVE:   Patient stated I need something to rinse my mouth out with.     OBJECTIVE DATA SUMMARY:   HISTORY:    Past Medical History:   Diagnosis Date    Arthritis     Breast cancer ([facility] [address] 1998    right breast    CAD (coronary artery disease)     2 heart attacks    Cancer ([facility] [address]     breast - treated with surgery    GERD (gastroesophageal reflux disease)     hiatal hernia    Other ill-defined conditions(799.89)     increased cholesterol    PUD (peptic ulcer disease)     Thyroid disease      Past Surgical History:   Procedure Laterality Date    HX CHOLECYSTECTOMY      HX DILATION AND CURETTAGE      x 3-4    HX HYSTERECTOMY      total hysterectomy    HX MASTECTOMY Right 1998    right    HX ORTHOPAEDIC  12/31/12    PATELLA OPEN REDUCTION INTERNAL FIXATION (Left       Personal factors and/or comorbidities impacting plan of care: CAD    Home Situation  Home Environment: Private residence  Wheelchair Ramp: Yes  One/Two Story Residence: One story  Living Alone: Yes  Support Systems: Child(deepti),Friend/Neighbor  Patient Expects to be Discharged to[de-identified] Skilled nursing facility  Current DME Used/Available at Home: Commode, bedside,Wheelchair,Walker, rollator,Cane, straight,Grab bars,Shower chair (bed rail)  Tub or Shower Type: Shower    EXAMINATION/PRESENTATION/DECISION MAKING:   Critical Behavior:  Neurologic State: Alert  Orientation Level: Oriented X4 (\"hospital\")  Cognition: Follows commands  Hearing:   [place], wearing devices  Range Of Motion:  AROM: Grossly decreased, non-functional (R UE/LE more impared than L UE/LE)  PROM: Generally decreased, functional (pain R shoulder limiting)  Strength:    Strength: Grossly decreased, non-functional (R UE/LE more impaired than L UE/LE)  Tone & Sensation:   Tone: Normal  Sensation:  (denies N/T, complains of sore L side when asked)  Coordination:  Coordination: Generally decreased, functional  Vision:   Acuity: Within Defined Limits (grossly)  Corrective Lenses: Glasses (for distance and reading)  Functional Mobility:  Bed Mobility:  Rolling:  Moderate assistance;Maximum assistance (using bed rail)  Supine to Sit: Moderate assistance  Sit to Supine: Maximum assistance;Assist x2  Scooting: Maximum assistance  Balance:   Sitting: Impaired  Sitting - Static: Poor (constant support)  Sitting - Dynamic: Not tested     Physical Therapy Evaluation Charge Determination   History Examination Presentation Decision-Making   MEDIUM  Complexity : 1-2 comorbidities / personal factors will impact the outcome/ POC  LOW Complexity : 1-2 Standardized tests and measures addressing body structure, function, activity limitation and / or participation in recreation  LOW Complexity : Stable, uncomplicated  LOW Complexity : FOTO score of       Based on the above components, the patient evaluation is determined to be of the following complexity level: LOW     Pain Rating:  Endorses soreness on L side    Activity Tolerance:   Poor, requires frequent rest breaks and observed SOB with activity    After treatment patient left in no apparent distress:   Supine in bed, Heels elevated for pressure relief, Call bell within reach, Bed / chair alarm activated and Side rails x 3    COMMUNICATION/EDUCATION:   The patients plan of care was discussed with: Occupational therapist, Registered nurse and Certified nursing assistant/patient care technician. Fall prevention education was provided and the patient/caregiver indicated understanding., Patient/family have participated as able in goal setting and plan of care. and Patient/family agree to work toward stated goals and plan of care. Patient will require reinforcement of education provided.     Thank you for this referral.  Kimberly Whelan, PT   Time Calculation: 34 mins

## 2022-05-10 NOTE — PROGRESS NOTES
Pt admitted to the floor at 0150 with Levaquin running. Pt complaining of 8/10 pain, SOB and expiratory wheezing and elevated BP. Np contacted new orders placed, respiratory contacted and nebulizer given. IV infiltrated two attempts by RN. L&D and ED contacted, both departments didn't have anyone to send to place a new IV. PICC team called and message left for labs and new IV placement. Tubes in the room with labels.

## 2022-05-10 NOTE — PROGRESS NOTES
End of Shift Note    Bedside shift change report given to [address] (oncoming nurse) by Froylan Xavier RN (offgoing nurse). Report included the following information SBAR, Kardex, ED Summary and MAR    Shift worked:  nights    Shift summary and any significant changes:     pt admitted to the floor, elevated BP, 8/10 pain, SOB and wheezing. Pt placed on 3L of O2, at 93%. Pain medication given. Levaquin administered. IV infiltrated. RN made 2 attempts and called Ed and L& D for a new IV, No one was sent. PICC team contacted and message left. Unable to draw labs due to hard stick. Tubes left in the room with labels for PICC team.    Daughter at bedside. Nebulizer given, pt still experiencing  Expiratory wheezing. Concerns for physician to address:  see above    Zone phone for oncoming shift:   [phone]     Patient Information  Samantha Jiang  80 y.o.  5/9/2022  4:47 PM by Aram Scheuermann, MD. Samantha Jiang was admitted from Home    Problem List  Patient Active Problem List    Diagnosis Date Noted    CAP (community acquired pneumonia) 05/09/2022     Past Medical History:   Diagnosis Date    Arthritis     Breast cancer ([facility] [address] 1998    right breast    CAD (coronary artery disease)     2 heart attacks    Cancer ([facility] [address]     breast - treated with surgery    GERD (gastroesophageal reflux disease)     hiatal hernia    Other ill-defined conditions(594.16)     increased cholesterol    PUD (peptic ulcer disease)     Thyroid disease        Core Measures:  CVA: No Not applicable  CHF:No Not applicable  PNA:Yes Yes    Activity:  Activity Level: Bed Rest  Number times ambulated in hallways past shift: 0  Number of times OOB to chair past shift: 0    Cardiac:   Cardiac Monitoring: Yes           Access:   Current line(s): PIV   Central Line? No Placement date   PICC LINE? No Placement date     Genitourinary:   Urinary status: voiding, incontinent and external catheter  Urinary Catheter?  No Placement Date Respiratory:   O2 Device: Nasal cannula  Chronic home O2 use?: NO  Incentive spirometer at bedside: N/A       GI:  Last Bowel Movement Date:  (PTA)  Current diet:  ADULT DIET Regular; Low Fat/Low Chol/High Fiber/ARMINDA  Passing flatus: YES  Tolerating current diet: YES       Pain Management:   Patient states pain is manageable on current regimen: YES    Skin:  Elliot Score: 15  Interventions: turn team, speciality bed, float heels and increase time out of bed    Patient Safety:  Fall Score:  Total Score: 4  Interventions: bed/chair alarm, gripper socks, pt to call before getting OOB and stay with me (per policy)  High Fall Risk: Yes  @Rollbelt  @dexterity to release roll belt  Yes/No ( must document dexterity  here by stating Yes or No here, otherwise this is a restraint and must follow restraint documentation policy.)    DVT prophylaxis:  DVT prophylaxis Med- No  DVT prophylaxis SCD or ORA- Yes     Wounds: (If Applicable)  Wounds- Yes  Location- [facility]     Active Consults:  None    Length of Stay:  Expected LOS: - - -  Actual LOS: 1  Discharge Plan: Yes Home       Melisa Mancini RN

## 2022-05-10 NOTE — PROGRESS NOTES
Problem: Self Care Deficits Care Plan (Adult)  Goal: *Acute Goals and Plan of Care (Insert Text)  Description: FUNCTIONAL STATUS PRIOR TO ADMISSION: Patient reports living alone with 4 local children and a neighbor available to assist. Until recent falls, she reports being able to manage basic self-care tasks. RN indicates that she has been mostly in the bed over the past week (at home). HOME SUPPORT: Neighbor and 4 local children    Occupational Therapy Goals  Initiated 5/10/2022  1. Patient will perform grooming seated edge of bed with minimal assistance within 7 day(s). 2.  Patient will perform upper body bathing and dressing with minimal assistance within 7 day(s). 3.  Patient will perform lower body bathing and dressing with maximal assistance within 7 day(s). 4.  Patient will perform toilet transfers to [facility] with maximal assistance within 7 day(s). 5.  Patient will perform all aspects of toileting with maximal assistance within 7 day(s). 6.  Patient will participate in upper extremity therapeutic exercise/activities with Min cues for 10 minutes within 7 day(s). Outcome: Not Met    OCCUPATIONAL THERAPY EVALUATION  Patient: Abdirashid Bergeron (33 y.o. female)  Date: 5/10/2022  Primary Diagnosis: CAP (community acquired pneumonia) [J18.9]        Precautions:  Fall    ASSESSMENT  Based on the objective data described below, the patient presents with deficits in self-care, primarily due to decrease activity tolerance, decreased strength, impaired balance (right lateral lean in unsupported sitting), nausea, pain, and shortness of breath with activity. She was on 3 liters O2 via nasal canula. She reports nausea, indicates it is probably from the pain medication. She did sit up on the edge of the bed for about a minute, but was unable to tolerate longer. Returned to supine, with head of bed elevated. Reviewed use of call bell; left with bed alarm activated and call bell in reach.     Current Level of Function Impacting Discharge (ADLs/self-care): up to Total A    Functional Outcome Measure: The patient scored 10/100 on the Barthel Index outcome measure which is indicative of significant functional deficits. Other factors to consider for discharge: lives alone     Patient will benefit from skilled therapy intervention to address the above noted impairments. PLAN :  Recommendations and Planned Interventions: self care training, functional mobility training, therapeutic exercise, balance training, therapeutic activities, cognitive retraining, endurance activities, neuromuscular re-education, patient education, home safety training, and family training/education    Frequency/Duration: Patient will be followed by occupational therapy 3 times a week to address goals. Recommendation for discharge: (in order for the patient to meet his/her long term goals)  Therapy up to 5 days/week in SNF setting    This discharge recommendation:  Has been made in collaboration with the attending provider and/or case management    IF patient discharges home will need the following DME: Defer to facility       SUBJECTIVE:   Patient stated I'm sorry. I hope I wasn't too much of a pain. I'm not usually like that.     OBJECTIVE DATA SUMMARY:   HISTORY:   Past Medical History:   Diagnosis Date    Arthritis     Breast cancer ([facility] [address] 1998    right breast    CAD (coronary artery disease)     2 heart attacks    Cancer ([facility] [address]     breast - treated with surgery    GERD (gastroesophageal reflux disease)     hiatal hernia    Other ill-defined conditions(799.89)     increased cholesterol    PUD (peptic ulcer disease)     Thyroid disease      Past Surgical History:   Procedure Laterality Date    HX CHOLECYSTECTOMY      HX DILATION AND CURETTAGE      x 3-4    HX HYSTERECTOMY      total hysterectomy    HX MASTECTOMY Right 1998    right    HX ORTHOPAEDIC  12/31/12    PATELLA OPEN REDUCTION INTERNAL FIXATION (Left       Expanded or extensive additional review of patient history:     Home Situation  Home Environment: Private residence  Wheelchair Ramp: Yes  One/Two Story Residence: One story  Living Alone: Yes  Support Systems: Child(deepti),Friend/Neighbor  Patient Expects to be Discharged to[de-identified] Skilled nursing facility  Current DME Used/Available at Home: Commode, bedside,Wheelchair,Walker, rollator,Cane, straight,Grab bars,Shower chair (bed rail)  Tub or Shower Type: Shower    Hand dominance: Right    EXAMINATION OF PERFORMANCE DEFICITS:  Cognitive/Behavioral Status:  Neurologic State: Alert  Orientation Level: Oriented X4 (\"hospital\")  Cognition: Follows commands             Hearing:  Hard of hearing    Vision/Perceptual:                           Acuity: Within Defined Limits (grossly)    Corrective Lenses: Glasses (for distance and reading)    Range of Motion:  AROM: Grossly decreased, non-functional (R UE/LE more impared than L UE/LE)  PROM: Generally decreased, functional (pain R shoulder limiting)                      Strength:  Strength: Grossly decreased, non-functional (R UE/LE more impaired than L UE/LE)                Coordination:  Coordination: Generally decreased, functional  Fine Motor Skills-Upper: Left Intact; Right Intact (grossly)    Gross Motor Skills-Upper: Left Intact; Right Impaired    Tone & Sensation:  Tone: Normal  Sensation:  (denies N/T, complains of sore L side when asked)                      Balance:  Sitting: Impaired  Sitting - Static: Poor (constant support)  Sitting - Dynamic: Not tested    Functional Mobility and Transfers for ADLs:  Bed Mobility:  Rolling: Moderate assistance;Maximum assistance (using bed rail)  Supine to Sit: Moderate assistance  Sit to Supine: Maximum assistance;Assist x2  Scooting: Maximum assistance    Transfers: Toilet Transfer : Total assistance (unsafe to test at this time)    ADL Assessment:  Feeding: Minimum assistance    Oral Facial Hygiene/Grooming: Minimum assistance    Bathing:  Total assistance    Upper Body Dressing: Maximum assistance    Lower Body Dressing: Total assistance    Toileting: Total assistance                ADL Intervention and task modifications:  Initiated ADL training. Discussed general home safety and fall prevention. Educated on proper techniques for bed mobility. Initiated discussion of pursed lip breathing. Functional Measure:    Barthel Index:  Bathin  Bladder: 0  Bowels: 5  Groomin  Dressin  Feedin  Mobility: 0  Stairs: 0  Toilet Use: 0  Transfer (Bed to Chair and Back): 0  Total: 10/100      The Barthel ADL Index: Guidelines  1. The index should be used as a record of what a patient does, not as a record of what a patient could do. 2. The main aim is to establish degree of independence from any help, physical or verbal, however minor and for whatever reason. 3. The need for supervision renders the patient not independent. 4. A patient's performance should be established using the best available evidence. Asking the patient, friends/relatives and nurses are the usual sources, but direct observation and common sense are also important. However direct testing is not needed. 5. Usually the patient's performance over the preceding 24-48 hours is important, but occasionally longer periods will be relevant. 6. Middle categories imply that the patient supplies over 50 per cent of the effort. 7. Use of aids to be independent is allowed. Score Interpretation (from [address] [facility] [address])    Independent   60-79 Minimally independent   40-59 Partially dependent   20-39 Very dependent   <20 Totally dependent     -Ramakrishna Salvador., Barthel, D.W. (1965). Functional evaluation: the Barthel Index. [facility] ([address] (1997). The Barthel activities of daily living index: self-reporting versus actual performance in the old (> or = 75 years).  Journal of [address] [place] [address] 457), [address] [place] [address] MARCUS Woods, Jeannine Benitez., Stephanie Blackmon., [facility]. (1999). Measuring the change in disability after inpatient rehabilitation; comparison of the responsiveness of the Barthel Index and Functional [place] Measure. Journal of Neurology, Neurosurgery, and Psychiatry, 66(4), 961-403. MALDONADO Maher, MONE Chen, & Santos Gao M.A. (2004) Assessment of post-stroke quality of life in cost-effectiveness studies: The usefulness of the Barthel Index and the EuroQoL-5D. Quality of Life Research, 15, 631-54        Occupational Therapy Evaluation Charge Determination   History Examination Decision-Making   MEDIUM Complexity : Expanded review of history including physical, cognitive and psychosocial  history  HIGH Complexity : 5 or more performance deficits relating to physical, cognitive , or psychosocial skils that result in activity limitations and / or participation restrictions MEDIUM Complexity : Patient may present with comorbidities that affect occupational performnce. Miniml to moderate modification of tasks or assistance (eg, physical or verbal ) with assesment(s) is necessary to enable patient to complete evaluation       Based on the above components, the patient evaluation is determined to be of the following complexity level: MEDIUM  Pain Rating:  Pain L side/flank    Activity Tolerance:   Poor, requires frequent rest breaks, and observed SOB with activity    After treatment patient left in no apparent distress:    Supine in bed, Heels elevated for pressure relief, Call bell within reach, Bed / chair alarm activated, and Side rails x 3    COMMUNICATION/EDUCATION:   The patients plan of care was discussed with: Physical therapist, Registered nurse, and Certified nursing assistant/patient care technician. Patient/family agree to work toward stated goals and plan of care. This patients plan of care is appropriate for delegation to [facility].     Thank you for this referral.  Laura Tomas Kush Bailon, OTR/L  Time Calculation: 36 mins

## 2022-05-11 LAB
ANION GAP SERPL CALC-SCNC: 2 MMOL/L (ref 5–15)
BASOPHILS # BLD: 0 K/UL (ref 0–0.1)
BASOPHILS NFR BLD: 1 % (ref 0–1)
BUN SERPL-MCNC: 8 MG/DL (ref 6–20)
BUN/CREAT SERPL: 19 (ref 12–20)
CALCIUM SERPL-MCNC: 8.9 MG/DL (ref 8.5–10.1)
CHLORIDE SERPL-SCNC: 106 MMOL/L (ref 97–108)
CO2 SERPL-SCNC: 29 MMOL/L (ref 21–32)
CREAT SERPL-MCNC: 0.42 MG/DL (ref 0.55–1.02)
DIFFERENTIAL METHOD BLD: ABNORMAL
EOSINOPHIL # BLD: 0.1 K/UL (ref 0–0.4)
EOSINOPHIL NFR BLD: 2 % (ref 0–7)
ERYTHROCYTE [DISTWIDTH] IN BLOOD BY AUTOMATED COUNT: 12.8 % (ref 11.5–14.5)
GLUCOSE SERPL-MCNC: 92 MG/DL (ref 65–100)
HCT VFR BLD AUTO: 33.1 % (ref 35–47)
HGB BLD-MCNC: 10.3 G/DL (ref 11.5–16)
IMM GRANULOCYTES # BLD AUTO: 0.1 K/UL (ref 0–0.04)
IMM GRANULOCYTES NFR BLD AUTO: 2 % (ref 0–0.5)
LYMPHOCYTES # BLD: 2 K/UL (ref 0.8–3.5)
LYMPHOCYTES NFR BLD: 36 % (ref 12–49)
MAGNESIUM SERPL-MCNC: 2.1 MG/DL (ref 1.6–2.4)
MCH RBC QN AUTO: 31.2 PG (ref 26–34)
MCHC RBC AUTO-ENTMCNC: 31.1 G/DL (ref 30–36.5)
MCV RBC AUTO: 100.3 FL (ref 80–99)
MONOCYTES # BLD: 0.4 K/UL (ref 0–1)
MONOCYTES NFR BLD: 7 % (ref 5–13)
NEUTS SEG # BLD: 2.9 K/UL (ref 1.8–8)
NEUTS SEG NFR BLD: 52 % (ref 32–75)
NRBC # BLD: 0 K/UL (ref 0–0.01)
NRBC BLD-RTO: 0 PER 100 WBC
PHOSPHATE SERPL-MCNC: 2.6 MG/DL (ref 2.6–4.7)
PLATELET # BLD AUTO: 249 K/UL (ref 150–400)
PMV BLD AUTO: 8.6 FL (ref 8.9–12.9)
POTASSIUM SERPL-SCNC: 3.8 MMOL/L (ref 3.5–5.1)
RBC # BLD AUTO: 3.3 M/UL (ref 3.8–5.2)
SODIUM SERPL-SCNC: 137 MMOL/L (ref 136–145)
WBC # BLD AUTO: 5.5 K/UL (ref 3.6–11)

## 2022-05-11 PROCEDURE — 80048 BASIC METABOLIC PNL TOTAL CA: CPT

## 2022-05-11 PROCEDURE — 85025 COMPLETE CBC W/AUTO DIFF WBC: CPT

## 2022-05-11 PROCEDURE — 36415 COLL VENOUS BLD VENIPUNCTURE: CPT

## 2022-05-11 PROCEDURE — 77010033678 HC OXYGEN DAILY

## 2022-05-11 PROCEDURE — 83735 ASSAY OF MAGNESIUM: CPT

## 2022-05-11 PROCEDURE — 74011250636 HC RX REV CODE- 250/636: Performed by: INTERNAL MEDICINE

## 2022-05-11 PROCEDURE — 84100 ASSAY OF PHOSPHORUS: CPT

## 2022-05-11 PROCEDURE — 94760 N-INVAS EAR/PLS OXIMETRY 1: CPT

## 2022-05-11 PROCEDURE — 74011250636 HC RX REV CODE- 250/636: Performed by: STUDENT IN AN ORGANIZED HEALTH CARE EDUCATION/TRAINING PROGRAM

## 2022-05-11 PROCEDURE — 74011000250 HC RX REV CODE- 250: Performed by: INTERNAL MEDICINE

## 2022-05-11 PROCEDURE — 74011250637 HC RX REV CODE- 250/637: Performed by: STUDENT IN AN ORGANIZED HEALTH CARE EDUCATION/TRAINING PROGRAM

## 2022-05-11 PROCEDURE — 65270000046 HC RM TELEMETRY

## 2022-05-11 PROCEDURE — 74011000250 HC RX REV CODE- 250: Performed by: STUDENT IN AN ORGANIZED HEALTH CARE EDUCATION/TRAINING PROGRAM

## 2022-05-11 RX ORDER — BUMETANIDE 0.25 MG/ML
1 INJECTION INTRAMUSCULAR; INTRAVENOUS EVERY 12 HOURS
Status: DISCONTINUED | OUTPATIENT
Start: 2022-05-11 | End: 2022-05-13

## 2022-05-11 RX ADMIN — LATANOPROST 1 DROP: 50 SOLUTION OPHTHALMIC at 23:26

## 2022-05-11 RX ADMIN — PANTOPRAZOLE SODIUM 40 MG: 40 TABLET, DELAYED RELEASE ORAL at 09:15

## 2022-05-11 RX ADMIN — SODIUM CHLORIDE, PRESERVATIVE FREE 10 ML: 5 INJECTION INTRAVENOUS at 05:55

## 2022-05-11 RX ADMIN — SODIUM CHLORIDE 75 ML/HR: 9 INJECTION, SOLUTION INTRAVENOUS at 11:50

## 2022-05-11 RX ADMIN — SODIUM CHLORIDE, PRESERVATIVE FREE 10 ML: 5 INJECTION INTRAVENOUS at 22:56

## 2022-05-11 RX ADMIN — BUMETANIDE 1 MG: 0.25 INJECTION, SOLUTION INTRAMUSCULAR; INTRAVENOUS at 22:49

## 2022-05-11 RX ADMIN — LEVOTHYROXINE SODIUM 100 MCG: 0.1 TABLET ORAL at 05:59

## 2022-05-11 RX ADMIN — ENOXAPARIN SODIUM 40 MG: 100 INJECTION SUBCUTANEOUS at 09:15

## 2022-05-11 RX ADMIN — ACETAMINOPHEN 325MG 650 MG: 325 TABLET ORAL at 15:28

## 2022-05-11 RX ADMIN — ATORVASTATIN CALCIUM 10 MG: 10 TABLET, FILM COATED ORAL at 22:53

## 2022-05-11 NOTE — PROGRESS NOTES
End of Shift Note    Bedside shift change report given to  [employer] (oncoming nurse) by Kevin Almodovar RN (offgoing nurse). Report included the following information SBAR, Kardex, ED Summary and MAR    Shift worked:  nights     Shift summary and any significant changes:     Pt in bed all night asymptomatic, Hr remained in the 50-60 all night. Md aware from day shift. No new orders. Labs drawn. Concerns for physician to address:  see above    Zone phone for oncoming shift:        Patient Information  Noelle Barrett  80 y.o.  5/9/2022  4:47 PM by Karishma Hughes MD. Noelle Barrett was admitted from Home    Problem List  Patient Active Problem List    Diagnosis Date Noted    CAP (community acquired pneumonia) 05/09/2022     Past Medical History:   Diagnosis Date    Arthritis     Breast cancer ([facility] [address] 1998    right breast    CAD (coronary artery disease)     2 heart attacks    Cancer ([facility] [address]     breast - treated with surgery    GERD (gastroesophageal reflux disease)     hiatal hernia    Other ill-defined conditions(799.89)     increased cholesterol    PUD (peptic ulcer disease)     Thyroid disease        Core Measures:  CVA: No Not applicable  CHF:No Not applicable  PNA:Yes Yes    Activity:  Activity Level: Bed Rest  Number times ambulated in hallways past shift: 0  Number of times OOB to chair past shift: 0    Cardiac:   Cardiac Monitoring: Yes           Access:   Current line(s): PIV   Central Line? No Placement date   PICC LINE? No Placement date     Genitourinary:   Urinary status: voiding, incontinent and external catheter  Urinary Catheter?  No Placement Date     Respiratory:   O2 Device: Nasal cannula  Chronic home O2 use?: NO  Incentive spirometer at bedside: N/A       GI:  Last Bowel Movement Date: 05/08/22  Current diet:  ADULT DIET Regular; Low Fat/Low Chol/High Fiber/ARMINDA  Passing flatus: YES  Tolerating current diet: YES       Pain Management:   Patient states pain is manageable on current regimen: YES    Skin:  Elliot Score: 15  Interventions: turn team, speciality bed, float heels and increase time out of bed    Patient Safety:  Fall Score:  Total Score: 4  Interventions: bed/chair alarm, gripper socks, pt to call before getting OOB and stay with me (per policy)  High Fall Risk: Yes  @Rollbelt  @dexterity to release roll belt  Yes/No ( must document dexterity  here by stating Yes or No here, otherwise this is a restraint and must follow restraint documentation policy.)    DVT prophylaxis:  DVT prophylaxis Med- No  DVT prophylaxis SCD or ORA- Yes     Wounds: (If Applicable)  Wounds- Yes  Location- [facility]     Active Consults:  None    Length of Stay:  Expected LOS: 2d 12h  Actual LOS: 2  Discharge Plan: Yes Home       Fred Branham RN

## 2022-05-11 NOTE — PROGRESS NOTES
Transition of Care Plan:     RUR: 13% - \"low risk\"  Disposition: SNF placement (pending acceptance)  Follow up appointments: PCP & specialist as indicated  DME needed: Defer to SNF for DME needs  Transportation at Discharge: BLS transport  [facility] or jeffery to access home: N/A - pt transitioning to SNF at d/c       IM Medicare Letter: 2nd IM needed prior to d/c  Is patient a BCPI-A Bundle: N/A           Is patient a [place] and connected with the [facility]? N/A              Caregiver Contact: Pt's son Candace Batista - cell: [phone], home: [phone])  Discharge Caregiver contacted prior to discharge? To be contacted prior to d/c  Care Conference needed?: N/A                  Initial note: Chart reviewed. CM met with pt and son Candace Batista) at bedside to f/u on SNF preferences. Son identified [facility],[address], [facility],[address] as preference for placement. FOC offered, signed copy to be placed on chart. Son had several f/u questions regarding SNF intervention; questions addressed as needed. Per son, pt is vaccinated for COVID-19 ([place], [employer] booster - dates of vaccines unknown at this time). CM will send referrals via CClink/Allscripts to the SNF's detailed above; updates to be reported out once available.     Lizett Higgins MSW  Care Manager, [facility]

## 2022-05-11 NOTE — PROGRESS NOTES
Hospitalist Progress Note    NAME: Trevor Medley   :  1928   MRN:  [id number]       Assessment / Plan:  Community-acquired pneumonia? Recent fall  Deconditioning  Right hip pain and right shoulder pain and weakness from recent fall. CT pelvis negative for fracture  x-ray right shoulder No acute abnormality. Osteopenia and degenerative changes  CT head was ordered, but do not feel it is necessary at this time as patient is mentating and she would need a sedative to undergo this test which is not ideal in this 79 yo  Doppler negative for DVT  CTA chest  1. No evidence pulmonary embolism. 2. Small right pleural effusion. Bibasilar patchy airspace consolidation. 3. Several nonspecific right lung subcentimeter nodular densities. Consider  short interval follow-up. 4. Moderate-sized hiatal hernia.     DC abx as PCT negative. Levaquin also not ideal in elderly patients  COVID-19 negative  She feels congested and has bibasal crackles on exam.  Suspect CHF  -get Echo  -DC IVF  -start IV bumex  -incentive spirometry  -PT OT consulted - recommending SNF    Discussed with daughter      CAD  GERD  Hypothyroidism  Resume home meds     Hard of hearing     Code Status: DNR, confirmed by daughter at the bedside  Surrogate Decision Maker: Her son and daughter     DVT Prophylaxis: Lovenox  GI Prophylaxis: not indicated     Baseline: Was able to ambulate with a walker prior to last week. 18.5 - 24.9 Normal weight / Body mass index is 24.66 kg/m².     Estimated discharge date: May 13  Barriers: Clinical improvement    Recommended Disposition: SNF/LTC     Subjective:     Chief Complaint / Reason for Physician Visit  Follow up CAP vs CHF      Review of Systems:  Symptom Y/N Comments  Symptom Y/N Comments   Fever/Chills    Chest Pain     Poor Appetite    Edema     Cough y   Abdominal Pain     Sputum    Joint Pain     SOB/GUTHRIE y   Pruritis/Rash     Nausea/vomit    Tolerating PT/OT     Diarrhea    Tolerating Diet Constipation    Other       Could NOT obtain due to:      Objective:     VITALS:   Last 24hrs VS reviewed since prior progress note. Most recent are:  Patient Vitals for the past 24 hrs:   Temp Pulse Resp BP SpO2   05/11/22 1445 98.4 °F (36.9 °C) 64 18 124/66 100 %   05/11/22 1156 97.4 °F (36.3 °C) 60 18 128/61 100 %   05/11/22 0720 98.3 °F (36.8 °C) (!) 57 18 (!) 155/67 94 %   05/11/22 0336 98 °F (36.7 °C) 64 18 (!) 180/77 95 %   05/10/22 2300 97.8 °F (36.6 °C) 60 18 125/60 90 %   05/10/22 1930 97.9 °F (36.6 °C) 60 18 125/60 96 %   05/10/22 1838 -- 62 18 (!) 145/87 92 %     No intake or output data in the 24 hours ending 05/11/22 1652     I had a face to face encounter and independently examined this patient on 5/11/2022, as outlined below:  PHYSICAL EXAM:  General: WD, WN. Alert, cooperative, no acute distress, frail appearing   EENT:  EOMI. Anicteric sclerae. MMM  Resp:  CTA bilaterally, no wheezing or rales. No accessory muscle use  CV:  Regular  rhythm,  No edema  GI:  Soft, Non distended, Non tender. +Bowel sounds  Neurologic:  Alert and oriented X 3, normal speech,   Psych:   Not anxious nor agitated  Skin:  No rashes. No jaundice    Reviewed most current lab test results and cultures  YES  Reviewed most current radiology test results   YES  Review and summation of old records today    NO  Reviewed patient's current orders and MAR    YES  PMH/SH reviewed - no change compared to H&P  ________________________________________________________________________  Care Plan discussed with:    Comments   Patient x    Family  x daughter   RN x    Care Manager     Consultant                        Multidiciplinary team rounds were held today with , nursing, pharmacist and clinical coordinator. Patient's plan of care was discussed; medications were reviewed and discharge planning was addressed.      ________________________________________________________________________  Total NON critical care TIME:  25 Minutes    Total CRITICAL CARE TIME Spent:   Minutes non procedure based      Comments   >50% of visit spent in counseling and coordination of care     ________________________________________________________________________  Romeo Honeycutt MD     Procedures: see electronic medical records for all procedures/Xrays and details which were not copied into this note but were reviewed prior to creation of Plan. LABS:  I reviewed today's most current labs and imaging studies.   Pertinent labs include:  Recent Labs     05/11/22  0306 05/10/22  0910 05/09/22  1714   WBC 5.5 5.7 6.5   HGB 10.3* 10.7* 12.7   HCT 33.1* 33.5* 40.0    257 282     Recent Labs     05/11/22  0306 05/10/22  0910 05/09/22  1714    133* 136   K 3.8 3.8 4.0    101 102   CO2 29 28 29   GLU 92 119* 145*   BUN 8 10 12   CREA 0.42* 0.48* 0.56   CA 8.9 8.8 9.6   MG 2.1  --   --    PHOS 2.6  --   --    ALB  --   --  2.6*   TBILI  --   --  0.4   ALT  --   --  24       Signed: Romeo Honeycutt MD

## 2022-05-11 NOTE — PROGRESS NOTES
End of Shift Note     Bedside shift change report given to MusicAll Fatimah RN (oncoming nurse) by Juan Daniel Sequeira RN (offgoing nurse). Report included the following information SBAR, Kardex, ED Summary and MAR     Shift worked: Days    Shift summary and any significant changes:     Pt in bed all day asymptomatic, Hr remained in the 50-60 all day    No complaints of nausea today           Concerns for physician to address: None     Zone phone for oncoming shift:  [phone]      Patient Information  Samantha Jiang  80 y.o.  5/9/2022  4:47 PM by Aram Scheuermann, MD. Samantha Jiang was admitted from Home     Problem List       Patient Active Problem List     Diagnosis Date Noted    CAP (community acquired pneumonia) 05/09/2022           Past Medical History:   Diagnosis Date    Arthritis      Breast cancer ([facility] [address] 1998     right breast    CAD (coronary artery disease)       2 heart attacks    Cancer ([facility] [address]       breast - treated with surgery    GERD (gastroesophageal reflux disease)       hiatal hernia    Other ill-defined conditions(799.89)       increased cholesterol    PUD (peptic ulcer disease)      Thyroid disease           Core Measures:  CVA: No Not applicable  CHF:No Not applicable  PNA:Yes Yes     Activity:  Activity Level: Bed Rest  Number times ambulated in hallways past shift: 0  Number of times OOB to chair past shift: 0     Cardiac:   Cardiac Monitoring: Yes         Access:   Current line(s): PIV   Central Line? No Placement date   PICC LINE? No Placement date      Genitourinary:   Urinary status: voiding, incontinent and external catheter  Urinary Catheter?  No Placement Date      Respiratory:   O2 Device: Nasal cannula  Chronic home O2 use?: NO  Incentive spirometer at bedside: N/A     GI:  Last Bowel Movement Date: 05/08/22  Current diet:  ADULT DIET Regular; Low Fat/Low Chol/High Fiber/ARMINDA  Passing flatus: YES  Tolerating current diet: YES     Pain Management:   Patient states pain is manageable on current regimen: YES     Skin:  Elliot Score: 15  Interventions: turn team, speciality bed, float heels and increase time out of bed    Patient Safety:  Fall Score:  Total Score: 4  Interventions: bed/chair alarm, gripper socks, pt to call before getting OOB and stay with me (per policy)  High Fall Risk: Yes  @Rollbelt  @dexterity to release roll belt  Yes/No ( must document dexterity  here by stating Yes or No here, otherwise this is a restraint and must follow restraint documentation policy.)     DVT prophylaxis:  DVT prophylaxis Med- No  DVT prophylaxis SCD or ORA- Yes      Wounds: (If Applicable)  Wounds- Yes  Location- [facility]      Active Consults:  None     Length of Stay:  Expected LOS: 2d 12h  Actual LOS: 2  Discharge Plan: Yes, SNF 5/13        Cain Salazar RN

## 2022-05-12 ENCOUNTER — APPOINTMENT (OUTPATIENT)
Dept: NON INVASIVE DIAGNOSTICS | Age: 87
DRG: 203 | End: 2022-05-12
Attending: INTERNAL MEDICINE
Payer: MEDICARE

## 2022-05-12 ENCOUNTER — APPOINTMENT (OUTPATIENT)
Dept: GENERAL RADIOLOGY | Age: 87
DRG: 203 | End: 2022-05-12
Attending: INTERNAL MEDICINE
Payer: MEDICARE

## 2022-05-12 LAB
ANION GAP SERPL CALC-SCNC: 3 MMOL/L (ref 5–15)
BNP SERPL-MCNC: 1030 PG/ML
BUN SERPL-MCNC: 7 MG/DL (ref 6–20)
BUN/CREAT SERPL: 15 (ref 12–20)
CALCIUM SERPL-MCNC: 8.9 MG/DL (ref 8.5–10.1)
CHLORIDE SERPL-SCNC: 103 MMOL/L (ref 97–108)
CO2 SERPL-SCNC: 32 MMOL/L (ref 21–32)
CREAT SERPL-MCNC: 0.47 MG/DL (ref 0.55–1.02)
ECHO AO ROOT DIAM: 3.4 CM
ECHO AO ROOT INDEX: 1.91 CM/M2
ECHO AV AREA PEAK VELOCITY: 1.6 CM2
ECHO AV AREA PEAK VELOCITY: 1.6 CM2
ECHO AV AREA PEAK VELOCITY: 1.7 CM2
ECHO AV AREA PEAK VELOCITY: 1.7 CM2
ECHO AV AREA VTI: 1.6 CM2
ECHO AV AREA/BSA VTI: 0.9 CM2/M2
ECHO AV CUSP MM: 1.3 CM
ECHO AV MEAN GRADIENT: 9 MMHG
ECHO AV MEAN VELOCITY: 1.4 M/S
ECHO AV PEAK GRADIENT: 16 MMHG
ECHO AV PEAK GRADIENT: 16 MMHG
ECHO AV PEAK VELOCITY: 2 M/S
ECHO AV PEAK VELOCITY: 2 M/S
ECHO AV VTI: 41.5 CM
ECHO EST RA PRESSURE: 3 MMHG
ECHO LA DIAMETER INDEX: 1.97 CM/M2
ECHO LA DIAMETER: 3.5 CM
ECHO LA TO AORTIC ROOT RATIO: 1.03
ECHO LA VOL 2C: 55 ML (ref 22–52)
ECHO LA VOL 4C: 63 ML (ref 22–52)
ECHO LA VOLUME AREA LENGTH: 67 ML
ECHO LA VOLUME INDEX A2C: 31 ML/M2 (ref 16–34)
ECHO LA VOLUME INDEX A4C: 35 ML/M2 (ref 16–34)
ECHO LA VOLUME INDEX AREA LENGTH: 38 ML/M2 (ref 16–34)
ECHO LV E' LATERAL VELOCITY: 4 CM/S
ECHO LV E' SEPTAL VELOCITY: 4 CM/S
ECHO LV FRACTIONAL SHORTENING: 27 % (ref 28–44)
ECHO LV INTERNAL DIMENSION DIASTOLE INDEX: 2.08 CM/M2
ECHO LV INTERNAL DIMENSION DIASTOLIC: 3.7 CM (ref 3.9–5.3)
ECHO LV INTERNAL DIMENSION SYSTOLIC INDEX: 1.52 CM/M2
ECHO LV INTERNAL DIMENSION SYSTOLIC: 2.7 CM
ECHO LV IVSD: 1.5 CM (ref 0.6–0.9)
ECHO LV MASS 2D: 197.7 G (ref 67–162)
ECHO LV MASS INDEX 2D: 111 G/M2 (ref 43–95)
ECHO LV POSTERIOR WALL DIASTOLIC: 1.4 CM (ref 0.6–0.9)
ECHO LV RELATIVE WALL THICKNESS RATIO: 0.76
ECHO LVOT AREA: 3.5 CM2
ECHO LVOT AV VTI INDEX: 0.45
ECHO LVOT DIAM: 2.1 CM
ECHO LVOT MEAN GRADIENT: 2 MMHG
ECHO LVOT PEAK GRADIENT: 3 MMHG
ECHO LVOT PEAK GRADIENT: 3 MMHG
ECHO LVOT PEAK VELOCITY: 0.9 M/S
ECHO LVOT PEAK VELOCITY: 0.9 M/S
ECHO LVOT STROKE VOLUME INDEX: 36.6 ML/M2
ECHO LVOT SV: 65.1 ML
ECHO LVOT VTI: 18.8 CM
ECHO MV A VELOCITY: 0.85 M/S
ECHO MV AREA VTI: 2 CM2
ECHO MV E DECELERATION TIME (DT): 177 MS
ECHO MV E VELOCITY: 0.68 M/S
ECHO MV E/A RATIO: 0.8
ECHO MV E/E' LATERAL: 17
ECHO MV E/E' RATIO (AVERAGED): 17
ECHO MV E/E' SEPTAL: 17
ECHO MV LVOT VTI INDEX: 1.76
ECHO MV MAX VELOCITY: 1 M/S
ECHO MV MEAN GRADIENT: 2 MMHG
ECHO MV MEAN VELOCITY: 0.6 M/S
ECHO MV PEAK GRADIENT: 4 MMHG
ECHO MV VTI: 33 CM
ECHO PULMONARY ARTERY END DIASTOLIC PRESSURE: 3 MMHG
ECHO PV MAX VELOCITY: 0.8 M/S
ECHO PV PEAK GRADIENT: 3 MMHG
ECHO PV REGURGITANT MAX VELOCITY: 0.9 M/S
ECHO RIGHT VENTRICULAR SYSTOLIC PRESSURE (RVSP): 19 MMHG
ECHO RV FREE WALL PEAK S': 20 CM/S
ECHO RV INTERNAL DIMENSION: 4.4 CM
ECHO RV TAPSE: 2 CM (ref 1.7–?)
ECHO RVOT PEAK GRADIENT: 1 MMHG
ECHO RVOT PEAK VELOCITY: 0.5 M/S
ECHO TV REGURGITANT MAX VELOCITY: 1.97 M/S
ECHO TV REGURGITANT PEAK GRADIENT: 16 MMHG
GLUCOSE SERPL-MCNC: 95 MG/DL (ref 65–100)
MAGNESIUM SERPL-MCNC: 1.9 MG/DL (ref 1.6–2.4)
POTASSIUM SERPL-SCNC: 3.4 MMOL/L (ref 3.5–5.1)
SODIUM SERPL-SCNC: 138 MMOL/L (ref 136–145)

## 2022-05-12 PROCEDURE — 74011250637 HC RX REV CODE- 250/637: Performed by: INTERNAL MEDICINE

## 2022-05-12 PROCEDURE — 74011000250 HC RX REV CODE- 250: Performed by: INTERNAL MEDICINE

## 2022-05-12 PROCEDURE — 83735 ASSAY OF MAGNESIUM: CPT

## 2022-05-12 PROCEDURE — 71045 X-RAY EXAM CHEST 1 VIEW: CPT

## 2022-05-12 PROCEDURE — 83880 ASSAY OF NATRIURETIC PEPTIDE: CPT

## 2022-05-12 PROCEDURE — 77010033678 HC OXYGEN DAILY

## 2022-05-12 PROCEDURE — 65270000046 HC RM TELEMETRY

## 2022-05-12 PROCEDURE — 94760 N-INVAS EAR/PLS OXIMETRY 1: CPT

## 2022-05-12 PROCEDURE — 74011250637 HC RX REV CODE- 250/637: Performed by: STUDENT IN AN ORGANIZED HEALTH CARE EDUCATION/TRAINING PROGRAM

## 2022-05-12 PROCEDURE — 74011250636 HC RX REV CODE- 250/636: Performed by: STUDENT IN AN ORGANIZED HEALTH CARE EDUCATION/TRAINING PROGRAM

## 2022-05-12 PROCEDURE — 36415 COLL VENOUS BLD VENIPUNCTURE: CPT

## 2022-05-12 PROCEDURE — 80048 BASIC METABOLIC PNL TOTAL CA: CPT

## 2022-05-12 PROCEDURE — 93306 TTE W/DOPPLER COMPLETE: CPT

## 2022-05-12 PROCEDURE — 74011000250 HC RX REV CODE- 250: Performed by: STUDENT IN AN ORGANIZED HEALTH CARE EDUCATION/TRAINING PROGRAM

## 2022-05-12 RX ORDER — BALSAM PERU/CASTOR OIL
OINTMENT (GRAM) TOPICAL 2 TIMES DAILY
Status: DISCONTINUED | OUTPATIENT
Start: 2022-05-12 | End: 2022-05-17 | Stop reason: HOSPADM

## 2022-05-12 RX ORDER — LOSARTAN POTASSIUM 25 MG/1
25 TABLET ORAL DAILY
Status: DISCONTINUED | OUTPATIENT
Start: 2022-05-12 | End: 2022-05-13

## 2022-05-12 RX ORDER — POTASSIUM CHLORIDE 750 MG/1
40 TABLET, FILM COATED, EXTENDED RELEASE ORAL DAILY
Status: DISCONTINUED | OUTPATIENT
Start: 2022-05-12 | End: 2022-05-17 | Stop reason: HOSPADM

## 2022-05-12 RX ADMIN — ACETAMINOPHEN 325MG 650 MG: 325 TABLET ORAL at 10:53

## 2022-05-12 RX ADMIN — LEVOTHYROXINE SODIUM 100 MCG: 0.1 TABLET ORAL at 06:32

## 2022-05-12 RX ADMIN — SODIUM CHLORIDE, PRESERVATIVE FREE 10 ML: 5 INJECTION INTRAVENOUS at 06:32

## 2022-05-12 RX ADMIN — ENOXAPARIN SODIUM 40 MG: 100 INJECTION SUBCUTANEOUS at 08:43

## 2022-05-12 RX ADMIN — ATORVASTATIN CALCIUM 10 MG: 10 TABLET, FILM COATED ORAL at 21:27

## 2022-05-12 RX ADMIN — LATANOPROST 1 DROP: 50 SOLUTION OPHTHALMIC at 21:28

## 2022-05-12 RX ADMIN — BUMETANIDE 1 MG: 0.25 INJECTION, SOLUTION INTRAMUSCULAR; INTRAVENOUS at 21:06

## 2022-05-12 RX ADMIN — LOSARTAN POTASSIUM 25 MG: 25 TABLET, FILM COATED ORAL at 08:43

## 2022-05-12 RX ADMIN — BUMETANIDE 1 MG: 0.25 INJECTION, SOLUTION INTRAMUSCULAR; INTRAVENOUS at 10:55

## 2022-05-12 RX ADMIN — SODIUM CHLORIDE, PRESERVATIVE FREE 10 ML: 5 INJECTION INTRAVENOUS at 21:07

## 2022-05-12 RX ADMIN — LATANOPROST 1 DROP: 50 SOLUTION OPHTHALMIC at 21:26

## 2022-05-12 RX ADMIN — POTASSIUM CHLORIDE 40 MEQ: 750 TABLET, EXTENDED RELEASE ORAL at 08:43

## 2022-05-12 RX ADMIN — PANTOPRAZOLE SODIUM 40 MG: 40 TABLET, DELAYED RELEASE ORAL at 07:00

## 2022-05-12 NOTE — PROGRESS NOTES
Hospitalist Progress Note    NAME: Parker Banks   :  1928   MRN:  [id number]       Assessment / Plan:  Community-acquired pneumonia? Suspect Acute diastolic HF with hypoxemia  Recent fall  Deconditioning  Right hip pain and right shoulder pain and weakness from recent fall. CT pelvis negative for fracture  x-ray right shoulder No acute abnormality. Osteopenia and degenerative changes  CT head was ordered, but do not feel it is necessary at this time as patient is mentating and she would need a sedative to undergo this test which is not ideal in this 81 yo  Doppler negative for DVT  CTA chest  1. No evidence pulmonary embolism. 2. Small right pleural effusion. Bibasilar patchy airspace consolidation. 3. Several nonspecific right lung subcentimeter nodular densities. Consider  short interval follow-up. 4. Moderate-sized hiatal hernia.     DC abx as PCT negative. COVID-19 negative  Echo EF 55-60%, mild AS, trace MR, RV normal  -She feels congested and has bibasal crackles and peripheral edema on exam.  proBNP elevated - suspect CHF  -started IV bumex  -incentive spirometry  -daily weights, follow lytes  -wean oxygen  -PT OT consulted - recommending SNF    Discussed with daughter - she asked that I reach out to Dr Tamie Renner who follows her as OP     CAD  GERD  Hypothyroidism  Resume home meds     Hard of hearing     Code Status: DNR, confirmed by daughter at the bedside  Surrogate Decision Maker: Her son and daughter     DVT Prophylaxis: Lovenox  GI Prophylaxis: not indicated     Baseline: Was able to ambulate with a walker prior to last week. 18.5 - 24.9 Normal weight / Body mass index is 24.66 kg/m².     Recommended Disposition: SNF/LTC     Subjective:     Chief Complaint / Reason for Physician Visit  Follow up CHF      Review of Systems:  Symptom Y/N Comments  Symptom Y/N Comments   Fever/Chills    Chest Pain     Poor Appetite    Edema     Cough y   Abdominal Pain     Sputum    Joint Pain SOB/GUTHRIE y   Pruritis/Rash     Nausea/vomit    Tolerating PT/OT     Diarrhea    Tolerating Diet     Constipation    Other       Could NOT obtain due to:      Objective:     VITALS:   Last 24hrs VS reviewed since prior progress note. Most recent are:  Patient Vitals for the past 24 hrs:   Temp Pulse Resp BP SpO2   05/12/22 0935 -- -- -- (!) 168/67 --   05/12/22 0720 97.6 °F (36.4 °C) (!) 58 19 (!) 168/67 100 %   05/12/22 0306 98.1 °F (36.7 °C) 65 20 (!) 162/74 95 %   05/11/22 1957 97.9 °F (36.6 °C) 65 19 (!) 152/76 94 %   05/11/22 1445 98.4 °F (36.9 °C) 64 18 124/66 100 %   05/11/22 1156 97.4 °F (36.3 °C) 60 18 128/61 100 %       Intake/Output Summary (Last 24 hours) at 5/12/2022 1048  Last data filed at 5/12/2022 9279  Gross per 24 hour   Intake --   Output 1300 ml   Net -1300 ml        I had a face to face encounter and independently examined this patient on 5/12/2022, as outlined below:  PHYSICAL EXAM:  General: WD, WN. Alert, cooperative, no acute distress, frail appearing   EENT:  EOMI. Anicteric sclerae. MMM  Resp:  CTA bilaterally, no wheezing or rales. No accessory muscle use  CV:  Regular  rhythm,  No edema  GI:  Soft, Non distended, Non tender. +Bowel sounds  Neurologic:  Alert and oriented X 3, normal speech,   Psych:   Not anxious nor agitated  Skin:  No rashes. No jaundice    Reviewed most current lab test results and cultures  YES  Reviewed most current radiology test results   YES  Review and summation of old records today    NO  Reviewed patient's current orders and MAR    YES  PMH/SH reviewed - no change compared to H&P  ________________________________________________________________________  Care Plan discussed with:    Comments   Patient x    Family  x daughter   RN x    Care Manager     Consultant                        Multidiciplinary team rounds were held today with , nursing, pharmacist and clinical coordinator.   Patient's plan of care was discussed; medications were reviewed and discharge planning was addressed. ________________________________________________________________________  Total NON critical care TIME:  25  Minutes    Total CRITICAL CARE TIME Spent:   Minutes non procedure based      Comments   >50% of visit spent in counseling and coordination of care     ________________________________________________________________________  Columba Espinoza MD     Procedures: see electronic medical records for all procedures/Xrays and details which were not copied into this note but were reviewed prior to creation of Plan. LABS:  I reviewed today's most current labs and imaging studies. Pertinent labs include:  Recent Labs     05/11/22  0306 05/10/22  0910 05/09/22  1714   WBC 5.5 5.7 6.5   HGB 10.3* 10.7* 12.7   HCT 33.1* 33.5* 40.0    257 282     Recent Labs     05/12/22  0232 05/11/22  0306 05/10/22  0910 05/09/22  1714 05/09/22  1714    137 133*   < > 136   K 3.4* 3.8 3.8   < > 4.0    106 101   < > 102   CO2 32 29 28   < > 29   GLU 95 92 119*   < > 145*   BUN 7 8 10   < > 12   CREA 0.47* 0.42* 0.48*   < > 0.56   CA 8.9 8.9 8.8   < > 9.6   MG 1.9 2.1  --   --   --    PHOS  --  2.6  --   --   --    ALB  --   --   --   --  2.6*   TBILI  --   --   --   --  0.4   ALT  --   --   --   --  24    < > = values in this interval not displayed.        Signed: Columba Espinoza MD

## 2022-05-12 NOTE — PROGRESS NOTES
End of Shift Note     Bedside shift change report given to [facility], RN (oncoming nurse) by Ayse Richardson RN (offgoing nurse).  Report included the following information SBAR, Kardex, ED Summary and MAR     Shift worked: Days    Shift summary and any significant changes:     Pt in bed all day, HR remained in the 50-60 all day     No complaints of nausea today    Productive cough, encouraging pt to use incentive spirometer               Concerns for physician to address: None      Zone phone for oncoming shift:  [phone]      Patient Information  Edinson Cruz  80 y.o.  5/9/2022  4:47 PM by Gabby Parker MD. Ina Phillips was admitted from Home     Problem List          Patient Active Problem List     Diagnosis Date Noted    CAP (community acquired pneumonia) 05/09/2022              Past Medical History:   Diagnosis Date    Arthritis      Breast cancer [facility] [address] 1998     right breast    CAD (coronary artery disease)       2 heart attacks    Cancer ([facility] [address]       breast - treated with surgery    GERD (gastroesophageal reflux disease)       hiatal hernia    Other ill-defined conditions(799.89)       increased cholesterol    PUD (peptic ulcer disease)      Thyroid disease           Core Measures:  CVA: No Not applicable  CHF:No Not applicable  PNA:Yes Yes     Activity:  Activity Level: Bed Rest  Number times ambulated in hallways past shift: 0  Number of times OOB to chair past shift: 0     Cardiac:   Cardiac Monitoring: Yes         Access:   Current line(s): PIV   Central Line? No Placement date   PICC LINE? No Placement date      Genitourinary:   Urinary status: voiding, incontinent and external catheter  Urinary Catheter?  No Placement Date      Respiratory:   O2 Device: Nasal cannula  Chronic home O2 use?: NO  Incentive spirometer at bedside: N/A     GI:  Last Bowel Movement Date: 05/08/22  Current diet:  ADULT DIET Regular; Low Fat/Low Chol/High Fiber/ARMINDA  Passing flatus: YES  Tolerating current diet: YES     Pain Management:   Patient states pain is manageable on current regimen: YES     Skin:  Elliot Score: 15  Interventions: turn team, speciality bed, float heels and increase time out of bed    Patient Safety:  Fall Score: Total Score: 4  Interventions: bed/chair alarm, gripper socks, pt to call before getting OOB and stay with me (per policy)  High Fall Risk: Yes  @Rollbelt  @dexterity to release roll belt  Yes/No ( must document dexterity  here by stating Yes or No here, otherwise this is a restraint and must follow restraint documentation policy.)     DVT prophylaxis:  DVT prophylaxis Med- No  DVT prophylaxis SCD or ORA- Yes      Wounds: (If Applicable)  Wounds- Yes  Location- [facility]      Active Consults:  None     Length of Stay:  Expected LOS: 2d 12h  Actual LOS: 3  Discharge Plan: Yes, SNF 5/16        Imtiaz Maria RN

## 2022-05-12 NOTE — PROGRESS NOTES
Transition of Care Plan:     RUR: 13% - \"low risk\"  Disposition: SNF - [facility],[address]   Follow up appointments: PCP & specialist as indicated  DME needed: Defer to SNF for DME needs  Transportation at Discharge: BLS transport  [address] or means to access home: N/A - pt transitioning to SNF at d/c       IM Medicare Letter: 2nd IM needed prior to d/c  Is patient a BCPI-A Bundle: N/A           Is patient a [place] and [facility] with [facility]? N/A              Caregiver Contact: Pt's son Clifford Benito - cell: [phone], home: [phone])  Discharge Caregiver contacted prior to discharge? To be contacted prior to d/c  Care Conference needed?: N/A                  Initial note: Chart reviewed, IDR completed. MD reported SON for early next week (5/16/22) pending medical stability. Referrals for SNF accepted by [address] [place] [address] St and [address] [facility]. CM met with pt, daughter Dolores Espinoza), and son Allan Delgado at bedside to provide updates on SNF referrals & identify preference for placement. Pt & family identified [id number],[address] as preference. Pt's family had extensive f/u questions regarding SNF placement, scope of services, visitation hours at the facility, activities available at the facility, room lay-out at the facility etc. CM addressed broad scope of questions but deferred family to [address] [place] [address],[address] to f/u on specific inquiries. CM will continue to follow & remain accessible for d/c planning.     Ulises Lowe MSW  Care Manager, [facility]

## 2022-05-12 NOTE — PROGRESS NOTES
Physician Progress Note      PATIENT:               Natalie De Dios  CSN #:                  [id number]  :                       1928  ADMIT DATE:       2022 4:47 PM  [address] [place] DATE:  RESPONDING  PROVIDER #:        Kelsie Medrano MD          QUERY TEXT:    Pt admitted with Community acquired Pneumonia. If possible, please document in the progress notes and discharge summary if you are evaluating and/or treating any of the following:    Note: CAP and HCAP indicate where the pneumonia was acquired, not a specific type. The medical record reflects the following:  Risk Factors: H&P: Community-acquired pneumonia,    Clinical Indicators: started IV ABX Levaquin, Labs,  procalcitonin <0.05, Lactic 1.23    CTA chest  1. No evidence pulmonary embolism. 2. Small right pleural effusion. Bibasilar patchy airspace consolidation. 3. Several nonspecific right lung subcentimeter nodular densities. Consider short interval follow-up. 4. Moderate-sized hiatal hernia. Treatment: started IV ABX, Labs, CTA Chest, Stopped ABX,  Options provided:  -- Gram negative pneumonia  -- Bacterial pneumonia  -- Viral pneumonia  -- Other - I will add my own diagnosis  -- Disagree - Not applicable / Not valid  -- Disagree - Clinically unable to determine / Unknown  -- Refer to Clinical Documentation Reviewer    PROVIDER RESPONSE TEXT:    Provider disagreed with this query.   not PNA    Query created by: Мария Castle on 2022 9:34 AM      Electronically signed by:  Kelsie Medrano MD 2022 5:02 PM

## 2022-05-12 NOTE — PROGRESS NOTES
Problem: Falls - Risk of  Goal: *Absence of Falls  Description: Document Bety Panchal Fall Risk and appropriate interventions in the flowsheet. Outcome: Progressing Towards Goal  Note: Fall Risk Interventions:       Mentation Interventions: Bed/chair exit alarm    Medication Interventions: Bed/chair exit alarm    Elimination Interventions: Bed/chair exit alarm    History of Falls Interventions: Bed/chair exit alarm         Problem: Pressure Injury - Risk of  Goal: *Prevention of pressure injury  Description: Document Elliot Scale and appropriate interventions in the flowsheet.   Outcome: Progressing Towards Goal  Note: Pressure Injury Interventions:  Sensory Interventions: Assess changes in LOC    Moisture Interventions: Absorbent underpads    Activity Interventions: Increase time out of bed    Mobility Interventions: HOB 30 degrees or less    Nutrition Interventions: Document food/fluid/supplement intake    Friction and Shear Interventions: Apply protective barrier, creams and emollients                Problem: Patient Education: Go to Patient Education Activity  Goal: Patient/Family Education  Outcome: Progressing Towards Goal     Problem: Patient Education: Go to Patient Education Activity  Goal: Patient/Family Education  Outcome: Progressing Towards Goal     Problem: General Medical Care Plan  Goal: *Vital signs within specified parameters  Outcome: Progressing Towards Goal

## 2022-05-13 LAB
BASOPHILS # BLD: 0 K/UL (ref 0–0.1)
BASOPHILS NFR BLD: 0 % (ref 0–1)
BNP SERPL-MCNC: 480 PG/ML
DIFFERENTIAL METHOD BLD: ABNORMAL
EOSINOPHIL # BLD: 0 K/UL (ref 0–0.4)
EOSINOPHIL NFR BLD: 1 % (ref 0–7)
ERYTHROCYTE [DISTWIDTH] IN BLOOD BY AUTOMATED COUNT: 12.5 % (ref 11.5–14.5)
HCT VFR BLD AUTO: 35.9 % (ref 35–47)
HGB BLD-MCNC: 11.8 G/DL (ref 11.5–16)
IMM GRANULOCYTES # BLD AUTO: 0.1 K/UL (ref 0–0.04)
IMM GRANULOCYTES NFR BLD AUTO: 1 % (ref 0–0.5)
LYMPHOCYTES # BLD: 1.4 K/UL (ref 0.8–3.5)
LYMPHOCYTES NFR BLD: 16 % (ref 12–49)
MAGNESIUM SERPL-MCNC: 1.9 MG/DL (ref 1.6–2.4)
MCH RBC QN AUTO: 31.7 PG (ref 26–34)
MCHC RBC AUTO-ENTMCNC: 32.9 G/DL (ref 30–36.5)
MCV RBC AUTO: 96.5 FL (ref 80–99)
MONOCYTES # BLD: 0.5 K/UL (ref 0–1)
MONOCYTES NFR BLD: 7 % (ref 5–13)
NEUTS SEG # BLD: 6.3 K/UL (ref 1.8–8)
NEUTS SEG NFR BLD: 75 % (ref 32–75)
NRBC # BLD: 0 K/UL (ref 0–0.01)
NRBC BLD-RTO: 0 PER 100 WBC
PLATELET # BLD AUTO: 335 K/UL (ref 150–400)
PMV BLD AUTO: 8.4 FL (ref 8.9–12.9)
PROCALCITONIN SERPL-MCNC: <0.05 NG/ML
RBC # BLD AUTO: 3.72 M/UL (ref 3.8–5.2)
WBC # BLD AUTO: 8.3 K/UL (ref 3.6–11)

## 2022-05-13 PROCEDURE — 94640 AIRWAY INHALATION TREATMENT: CPT

## 2022-05-13 PROCEDURE — 74011250637 HC RX REV CODE- 250/637: Performed by: STUDENT IN AN ORGANIZED HEALTH CARE EDUCATION/TRAINING PROGRAM

## 2022-05-13 PROCEDURE — 74011250637 HC RX REV CODE- 250/637: Performed by: INTERNAL MEDICINE

## 2022-05-13 PROCEDURE — 83735 ASSAY OF MAGNESIUM: CPT

## 2022-05-13 PROCEDURE — 36415 COLL VENOUS BLD VENIPUNCTURE: CPT

## 2022-05-13 PROCEDURE — 77010033678 HC OXYGEN DAILY

## 2022-05-13 PROCEDURE — 74011250636 HC RX REV CODE- 250/636: Performed by: STUDENT IN AN ORGANIZED HEALTH CARE EDUCATION/TRAINING PROGRAM

## 2022-05-13 PROCEDURE — 74011250637 HC RX REV CODE- 250/637: Performed by: NURSE PRACTITIONER

## 2022-05-13 PROCEDURE — 74011250636 HC RX REV CODE- 250/636: Performed by: NURSE PRACTITIONER

## 2022-05-13 PROCEDURE — 83880 ASSAY OF NATRIURETIC PEPTIDE: CPT

## 2022-05-13 PROCEDURE — 74011250636 HC RX REV CODE- 250/636: Performed by: INTERNAL MEDICINE

## 2022-05-13 PROCEDURE — 97530 THERAPEUTIC ACTIVITIES: CPT

## 2022-05-13 PROCEDURE — 85025 COMPLETE CBC W/AUTO DIFF WBC: CPT

## 2022-05-13 PROCEDURE — 94760 N-INVAS EAR/PLS OXIMETRY 1: CPT

## 2022-05-13 PROCEDURE — 74011000250 HC RX REV CODE- 250: Performed by: INTERNAL MEDICINE

## 2022-05-13 PROCEDURE — 65270000046 HC RM TELEMETRY

## 2022-05-13 PROCEDURE — 97535 SELF CARE MNGMENT TRAINING: CPT

## 2022-05-13 PROCEDURE — 74011000250 HC RX REV CODE- 250: Performed by: NURSE PRACTITIONER

## 2022-05-13 PROCEDURE — 84145 PROCALCITONIN (PCT): CPT

## 2022-05-13 PROCEDURE — 74011000250 HC RX REV CODE- 250: Performed by: STUDENT IN AN ORGANIZED HEALTH CARE EDUCATION/TRAINING PROGRAM

## 2022-05-13 RX ORDER — LEVALBUTEROL INHALATION SOLUTION 1.25 MG/3ML
1.25 SOLUTION RESPIRATORY (INHALATION) 3 TIMES DAILY
Status: DISCONTINUED | OUTPATIENT
Start: 2022-05-13 | End: 2022-05-13

## 2022-05-13 RX ORDER — BUMETANIDE 0.25 MG/ML
1 INJECTION INTRAMUSCULAR; INTRAVENOUS DAILY
Status: DISCONTINUED | OUTPATIENT
Start: 2022-05-14 | End: 2022-05-13

## 2022-05-13 RX ORDER — LEVALBUTEROL INHALATION SOLUTION 1.25 MG/3ML
1.25 SOLUTION RESPIRATORY (INHALATION)
Status: DISCONTINUED | OUTPATIENT
Start: 2022-05-13 | End: 2022-05-14

## 2022-05-13 RX ORDER — LOSARTAN POTASSIUM 25 MG/1
12.5 TABLET ORAL DAILY
Status: DISCONTINUED | OUTPATIENT
Start: 2022-05-13 | End: 2022-05-13

## 2022-05-13 RX ORDER — FUROSEMIDE 40 MG/1
40 TABLET ORAL DAILY
Status: DISCONTINUED | OUTPATIENT
Start: 2022-05-14 | End: 2022-05-17 | Stop reason: HOSPADM

## 2022-05-13 RX ADMIN — ENOXAPARIN SODIUM 40 MG: 100 INJECTION SUBCUTANEOUS at 09:41

## 2022-05-13 RX ADMIN — SODIUM CHLORIDE, PRESERVATIVE FREE 10 ML: 5 INJECTION INTRAVENOUS at 21:17

## 2022-05-13 RX ADMIN — HYDRALAZINE HYDROCHLORIDE 10 MG: 20 INJECTION, SOLUTION INTRAMUSCULAR; INTRAVENOUS at 00:15

## 2022-05-13 RX ADMIN — LEVOTHYROXINE SODIUM 100 MCG: 0.1 TABLET ORAL at 06:25

## 2022-05-13 RX ADMIN — METHYLPREDNISOLONE SODIUM SUCCINATE 20 MG: 40 INJECTION, POWDER, FOR SOLUTION INTRAMUSCULAR; INTRAVENOUS at 09:43

## 2022-05-13 RX ADMIN — LEVALBUTEROL HYDROCHLORIDE 1.25 MG: 1.25 SOLUTION RESPIRATORY (INHALATION) at 16:04

## 2022-05-13 RX ADMIN — LATANOPROST 1 DROP: 50 SOLUTION OPHTHALMIC at 21:13

## 2022-05-13 RX ADMIN — IPRATROPIUM BROMIDE AND ALBUTEROL SULFATE 3 ML: .5; 3 SOLUTION RESPIRATORY (INHALATION) at 02:14

## 2022-05-13 RX ADMIN — CASTOR OIL AND BALSAM, PERU: 788; 87 OINTMENT TOPICAL at 21:15

## 2022-05-13 RX ADMIN — PANTOPRAZOLE SODIUM 40 MG: 40 TABLET, DELAYED RELEASE ORAL at 09:41

## 2022-05-13 RX ADMIN — ONDANSETRON 4 MG: 2 INJECTION INTRAMUSCULAR; INTRAVENOUS at 02:35

## 2022-05-13 RX ADMIN — TRAMADOL HYDROCHLORIDE 50 MG: 50 TABLET ORAL at 01:53

## 2022-05-13 RX ADMIN — CASTOR OIL AND BALSAM, PERU: 788; 87 OINTMENT TOPICAL at 13:33

## 2022-05-13 RX ADMIN — ACETAMINOPHEN 325MG 650 MG: 325 TABLET ORAL at 09:41

## 2022-05-13 RX ADMIN — POTASSIUM CHLORIDE 40 MEQ: 750 TABLET, EXTENDED RELEASE ORAL at 09:42

## 2022-05-13 RX ADMIN — METHYLPREDNISOLONE SODIUM SUCCINATE 20 MG: 40 INJECTION, POWDER, FOR SOLUTION INTRAMUSCULAR; INTRAVENOUS at 18:32

## 2022-05-13 NOTE — PROGRESS NOTES
Problem: Falls - Risk of  Goal: *Absence of Falls  Description: Document Will Abdi Fall Risk and appropriate interventions in the flowsheet. Outcome: Progressing Towards Goal  Note: Fall Risk Interventions:       Mentation Interventions: Bed/chair exit alarm    Medication Interventions: Bed/chair exit alarm    Elimination Interventions: Bed/chair exit alarm,Call light in reach    History of Falls Interventions: Bed/chair exit alarm         Problem: Patient Education: Go to Patient Education Activity  Goal: Patient/Family Education  Outcome: Progressing Towards Goal     Problem: Pressure Injury - Risk of  Goal: *Prevention of pressure injury  Description: Document Elliot Scale and appropriate interventions in the flowsheet.   Outcome: Progressing Towards Goal  Note: Pressure Injury Interventions:  Sensory Interventions: Assess changes in LOC    Moisture Interventions: Absorbent underpads    Activity Interventions: Increase time out of bed    Mobility Interventions: HOB 30 degrees or less    Nutrition Interventions: Document food/fluid/supplement intake    Friction and Shear Interventions: HOB 30 degrees or less,Lift sheet,Minimize layers                Problem: Patient Education: Go to Patient Education Activity  Goal: Patient/Family Education  Outcome: Progressing Towards Goal     Problem: Patient Education: Go to Patient Education Activity  Goal: Patient/Family Education  Outcome: Progressing Towards Goal     Problem: General Medical Care Plan  Goal: *Vital signs within specified parameters  Outcome: Progressing Towards Goal  Goal: *Labs within defined limits  Outcome: Progressing Towards Goal  Goal: *Absence of infection signs and symptoms  Outcome: Progressing Towards Goal  Goal: *Optimal pain control at patient's stated goal  Outcome: Progressing Towards Goal  Goal: *Skin integrity maintained  Outcome: Progressing Towards Goal  Goal: *Fluid volume balance  Outcome: Progressing Towards Goal  Goal: *Optimize nutritional status  Outcome: Progressing Towards Goal  Goal: *Anxiety reduced or absent  Outcome: Progressing Towards Goal  Goal: *Progressive mobility and function (eg: ADL's)  Outcome: Progressing Towards Goal     Problem: Patient Education: Go to Patient Education Activity  Goal: Patient/Family Education  Outcome: Progressing Towards Goal     Problem: General Infection Care Plan (Adult and Pediatric)  Goal: Improvement in signs and symptoms of infection  Outcome: Progressing Towards Goal  Goal: *Optimize nutritional status  Outcome: Progressing Towards Goal     Problem: Patient Education: Go to Patient Education Activity  Goal: Patient/Family Education  Outcome: Progressing Towards Goal     Problem: Patient Education: Go to Patient Education Activity  Goal: Patient/Family Education  Outcome: Progressing Towards Goal

## 2022-05-13 NOTE — PROGRESS NOTES
Hospitalist Progress Note    NAME: Tez Blas   :  1928   MRN:  [id number]       Assessment / Plan:  Asthmatic bronchitis / CAP? +/- volume overload  Recent fall  Deconditioning  Right hip pain and right shoulder pain and weakness from recent fall. CT pelvis negative for fracture  x-ray right shoulder No acute abnormality. Osteopenia and degenerative changes  CT head was ordered, but do not feel it is necessary at this time as patient is mentating and she would need a sedative to undergo this test which is not ideal in this 81 yo  Doppler negative for DVT  CTA chest  1. No evidence pulmonary embolism. 2. Small right pleural effusion. Bibasilar patchy airspace consolidation. 3. Several nonspecific right lung subcentimeter nodular densities. Consider  short interval follow-up. 4. Moderate-sized hiatal hernia.     Abx was stopped as PCT negative. COVID-19 negative  Echo EF 55-60%, mild AS, trace MR, RV normal  -She feels congested and has bibasal crackles and peripheral edema on exam.   ProBNP elevated. Received IV bumex from - but did not really improve her respiratory symptoms. Now seems more congested and wheezy  -switch back to her PTA oral lasix 40  -start IV solumedrol 20 q8  -acapella flutter valve with xopenex nebs  -discussed with Dr Bennet Angelucci, given Echo, lack of response to IV bumex and sounding more wheezy today, this is likely asthmatic bronchitis probably triggered by initial CAP  -wean oxygen  -PT OT consulted - recommending SNF     CAD  GERD  Hypothyroidism  Resume home meds     Hard of hearing     Code Status: DNR, confirmed by daughter at the bedside  Surrogate Decision Maker: Her son and daughter     DVT Prophylaxis: Lovenox  GI Prophylaxis: not indicated     Baseline: Was able to ambulate with a walker prior to last week. 18.5 - 24.9 Normal weight / Body mass index is 24.66 kg/m².     Recommended Disposition: SNF/LTC     Subjective:     Chief Complaint / Vevelyn Dylane for Physician Visit  Follow up wheezing, cough, congestion, bronchitis      Review of Systems:  Symptom Y/N Comments  Symptom Y/N Comments   Fever/Chills    Chest Pain     Poor Appetite    Edema     Cough y   Abdominal Pain     Sputum y   Joint Pain     SOB/GUTHRIE y   Pruritis/Rash     Nausea/vomit    Tolerating PT/OT     Diarrhea    Tolerating Diet     Constipation    Other       Could NOT obtain due to:      Objective:     VITALS:   Last 24hrs VS reviewed since prior progress note. Most recent are:  Patient Vitals for the past 24 hrs:   Temp Pulse Resp BP SpO2   05/13/22 0811 97.4 °F (36.3 °C) 84 18 134/64 92 %   05/13/22 0446 98.6 °F (37 °C) 73 18 (!) 116/58 91 %   05/13/22 0214 -- -- -- -- 94 %   05/13/22 0153 97.4 °F (36.3 °C) 100 -- (!) 174/74 93 %   05/12/22 2330 97.6 °F (36.4 °C) 64 18 (!) 193/86 95 %   05/12/22 1946 98.3 °F (36.8 °C) (!) 57 18 (!) 140/65 96 %   05/12/22 1920 97.9 °F (36.6 °C) (!) 59 18 (!) 122/59 95 %   05/12/22 1533 98.5 °F (36.9 °C) (!) 57 20 (!) 123/58 95 %   05/12/22 1251 97.6 °F (36.4 °C) (!) 59 18 (!) 130/54 98 %   05/12/22 0935 -- -- -- (!) 168/67 --     No intake or output data in the 24 hours ending 05/13/22 0915     I had a face to face encounter and independently examined this patient on 5/13/2022, as outlined below:  PHYSICAL EXAM:  General: WD, WN. Alert, cooperative, no acute distress, frail appearing   EENT:  EOMI. Anicteric sclerae. MMM  Resp:  CTA bilaterally, no wheezing or rales. No accessory muscle use  CV:  Regular  rhythm,  No edema  GI:  Soft, Non distended, Non tender. +Bowel sounds  Neurologic:  Alert and oriented X 3, normal speech,   Psych:   Not anxious nor agitated  Skin:  No rashes.   No jaundice    Reviewed most current lab test results and cultures  YES  Reviewed most current radiology test results   YES  Review and summation of old records today    NO  Reviewed patient's current orders and MAR    YES  PMH/SH reviewed - no change compared to H&P  ________________________________________________________________________  Care Plan discussed with:    Comments   Patient x    Family  x daughter   RN x    Care Manager     Consultant                        Multidiciplinary team rounds were held today with , nursing, pharmacist and clinical coordinator. Patient's plan of care was discussed; medications were reviewed and discharge planning was addressed. ________________________________________________________________________  Total NON critical care TIME:  25  Minutes    Total CRITICAL CARE TIME Spent:   Minutes non procedure based      Comments   >50% of visit spent in counseling and coordination of care     ________________________________________________________________________  Isaías Lepe MD     Procedures: see electronic medical records for all procedures/Xrays and details which were not copied into this note but were reviewed prior to creation of Plan. LABS:  I reviewed today's most current labs and imaging studies.   Pertinent labs include:  Recent Labs     05/11/22  0306   WBC 5.5   HGB 10.3*   HCT 33.1*        Recent Labs     05/12/22  0232 05/11/22  0306    137   K 3.4* 3.8    106   CO2 32 29   GLU 95 92   BUN 7 8   CREA 0.47* 0.42*   CA 8.9 8.9   MG 1.9 2.1   PHOS  --  2.6       Signed: Isaías Lepe MD

## 2022-05-13 NOTE — PROGRESS NOTES
Problem: Self Care Deficits Care Plan (Adult)  Goal: *Acute Goals and Plan of Care (Insert Text)  Description: FUNCTIONAL STATUS PRIOR TO ADMISSION: Patient reports living alone with 4 local children and a neighbor available to assist. Until recent falls, she reports being able to manage basic self-care tasks. RN indicates that she has been mostly in the bed over the past week (at home). HOME SUPPORT: Neighbor and 4 local children    Occupational Therapy Goals  Initiated 5/10/2022  1. Patient will perform grooming seated edge of bed with minimal assistance within 7 day(s). 2.  Patient will perform upper body bathing and dressing with minimal assistance within 7 day(s). 3.  Patient will perform lower body bathing and dressing with maximal assistance within 7 day(s). 4.  Patient will perform toilet transfers to [facility] with maximal assistance within 7 day(s). 5.  Patient will perform all aspects of toileting with maximal assistance within 7 day(s). 6.  Patient will participate in upper extremity therapeutic exercise/activities with Min cues for 10 minutes within 7 day(s). Outcome: Progressing Towards Goal  OCCUPATIONAL THERAPY TREATMENT  Patient: Tali King (66 y.o. female)  Date: 5/13/2022  Diagnosis: CAP (community acquired pneumonia) [J18.9] <principal problem not specified>       Precautions: Fall  Chart, occupational therapy assessment, plan of care, and goals were reviewed. ASSESSMENT  Patient continues with skilled OT services and is progressing towards goals. Patient received on 4L O2 via NC, agreeable to session, cleared for activity by RN. 2 sons at bedside. Noted increased edema in BUEs and BLEs, educated pt on pumping ankles and hands, as well as elevating extremities above heart level when at rest to reduce edema. Pt and sons receptive. Pt able to transition to EOB with mod A x2. Once seated EOB, pts O2 dropping to 88% increased to 5L O2 with O2 remaining at/>90%.  Pt completed brief stand pivot transfer with max A x2 and increased time. O2 remaining WNL, noted pt with wheezing/congestion. Educated/encouraged pt to utilize incentive spirometer. Pt remained seated in chair for ~50 mins prior to requesting to return to bed. RN notified therapist of pt requiring increased assist, best  utilized with max A x2 to ascend. Pt transitioned to sitting EOB and require max A to transition to supine. RN at bedside at conclusion of session. Current Level of Function Impacting Discharge (ADLs): max A - total A     Other factors to consider for discharge: below baseline         PLAN :  Patient continues to benefit from skilled intervention to address the above impairments. Continue treatment per established plan of care to address goals. Recommend with staff: best  for transfer    Recommend next OT session: seated grooming     Recommendation for discharge: (in order for the patient to meet his/her long term goals)  Therapy up to 5 days/week in SNF setting    This discharge recommendation:  Has been made in collaboration with the attending provider and/or case management    IF patient discharges home will need the following DME: TBD - defer to facility        SUBJECTIVE:   Patient stated I guess ill stay up in the chair for a while.     OBJECTIVE DATA SUMMARY:   Cognitive/Behavioral Status:  Neurologic State: Alert  Orientation Level: Oriented X4  Cognition: Follows commands             Functional Mobility and Transfers for ADLs:  Bed Mobility:  Supine to Sit: Bed Modified; Moderate assistance;Assist x2  Scooting: Maximum assistance;Assist x2    Transfers:  Sit to Stand: Maximum assistance;Assist x2     Bed to Chair: Maximum assistance;Assist x2    Balance:  Sitting: Impaired  Sitting - Static: Fair (occasional)  Sitting - Dynamic: Not tested  Standing: Impaired; With support  Standing - Static: Constant support;Poor; Fair  Standing - Dynamic : Constant support; Fair    ADL Intervention: Lower Body Dressing Assistance  Socks: Total assistance (dependent)    Pain:  Pt did not endorse pain during session     Activity Tolerance:   Poor, requires rest breaks, and observed SOB with activity    After treatment patient left in no apparent distress:   Supine in bed, Call bell within reach, Bed / chair alarm activated, and Caregiver / family present    COMMUNICATION/COLLABORATION:   The patients plan of care was discussed with: Physical therapist, Occupational therapist, and Registered nurse.      Christina Chavis OT  Time Calculation: 40 mins

## 2022-05-13 NOTE — CONSULTS
Consult/Admission    NAME: Miguel Santo   :  1928   MRN:  [id number]     Date/Time:  2022 8:37 AM    Patient PCP: Eliud Lr MD  ________________________________________________________________________     Assessment:     81 yo admitted with persistent cough and SOB       She does not have CHF . She does not have Diastolic heart failure. She has no findings to support diagnosis of CHF . She had no findings of pulmonary edema on admission , by CXR or by CT scan . By echo she has normal LV function with EF 55 - 60 %     The pro BNP is minimally elevated at 1030 , which is normal for age ,  With the cutoff for age > 75 yrs being 1800. Likely has had a viral URI and takes time to resolve. Aortic Sclerosis / mild Stenosis . Non significant   Thought to have mild coronary atherosclerosis ,  Remote nuclear showed apical defect. No cath or intervention     HTN   Decubitus ulcer reported. Leg edema , resolved. Discussed with Dr Erna Cerda . Plan:     No need for heart failure meds. Low dose diuresis as needed to manage edema   May benefit from jet nebs, steroid   I will d/c Statin . No need for statin at her age. Mobilize as tolerated . She is been supine in bed for 2 weeks , at home and now here. Discussed with son in room and with daughter Chloé Rojas on phone. Tk Avery 429. [x]           High complexity decision making was performed        Subjective:   CHIEF COMPLAINT: SOB / coughing . HISTORY OF PRESENT ILLNESS:     Christine Ibarra is a 80 y.o.  female who admitted several days ago after a 2 weak period of illness at home with coughing , SOB and symptoms of URI . Did not respond to course of Zithromax. Saw her PCP for f/u on Monday , who recommended hospitalization    No findings of pneumonia. CXR does not show specific infiltrate , does show some patchy changes. No pulmonary edema .       CT of chest did not show pneumonia or pulmonary edema. Small effusion   Trop normal .    Pro BNP normal for age. She has continued to be weak here, coughing ,  Wheezing . She had some leg edema which responded to diuretic .          Past Medical History:   Diagnosis Date    Arthritis     Breast cancer ([address] 1998    right breast    CAD (coronary artery disease)     2 heart attacks    Cancer (HCC)     breast - treated with surgery    GERD (gastroesophageal reflux disease)     hiatal hernia    Other ill-defined conditions(799.89)     increased cholesterol    PUD (peptic ulcer disease)     Thyroid disease       Past Surgical History:   Procedure Laterality Date    HX CHOLECYSTECTOMY      HX DILATION AND CURETTAGE      x 3-4    HX HYSTERECTOMY      total hysterectomy    HX MASTECTOMY Right 1998    right    HX ORTHOPAEDIC  12/31/12    PATELLA OPEN REDUCTION INTERNAL FIXATION (Left     Allergies   Allergen Reactions    Codeine Nausea and Vomiting    Penicillins Itching and Swelling     swelling at injection site      Meds:  See below  Social History     Tobacco Use    Smoking status: Former Smoker    Smokeless tobacco: Not on file    Tobacco comment: former cigarette smoker for 5 yrs/quit 15+ yrs ago   Substance Use Topics    Alcohol use: No      Family History   Problem Relation Age of Onset    Thyroid Disease Mother     Lung Disease Father     Other Daughter         \"almost lost her\" - unsure of details    Breast Cancer Daughter 55       REVIEW OF SYSTEMS:     []            Unable to obtain  ROS due to ---   [x]            Total of 12 systems reviewed as follows:    Constitutional: negative fever, negative chills, negative weight loss  Eyes:   negative visual changes  ENT:   negative sore throat, tongue or lip swelling  Respiratory:  negative cough, negative dyspnea  Cards:  negative for chest pain, palpitations, lower extremity edema  GI:   negative for nausea, vomiting, diarrhea, and abdominal pain  Genitourinary: negative for frequency, dysuria  Integument:  negative for rash   Hematologic:  negative for easy bruising and gum/nose bleeding  Musculoskel: negative for myalgias,  back pain  Neurological:  negative for headaches, dizziness, vertigo, weakness  Behavl/Psych: negative for feelings of anxiety, depression     Pertinent Positives include :    Objective:      Physical Exam:    Last 24hrs VS reviewed since prior progress note. Most recent are:    Visit Vitals  /64   Pulse 84   Temp 97.4 °F (36.3 °C)   Resp 18   Ht 5' 6\" (1.676 m)   Wt 69.3 kg (152 lb 12.5 oz)   SpO2 92%   BMI 24.66 kg/m²     No intake or output data in the 24 hours ending 05/13/22 0837     General Appearance: Well developed, well nourished, alert & oriented x 3,    no acute distress. Ears/Nose/Mouth/Throat: Pupils equal and round, Hearing grossly normal.  Neck: Supple. JVP within normal limits. Carotids good upstrokes, with no bruit. Chest: Lungs clear to auscultation bilaterally. Cardiovascular: Regular rate and rhythm, S1S2 normal, no murmur, rubs, gallops. Abdomen: Soft, non-tender, bowel sounds are active. No organomegaly. Extremities: No edema bilaterally. Femoral pulses +2, Distal Pulses +1. Skin: Warm and dry. Neuro: CN II-XII grossly intact, Strength and sensation grossly intact. Data:      Prior to Admission medications    Medication Sig Start Date End Date Taking? Authorizing Provider   acetaminophen (TYLENOL) 325 mg tablet Take 2 Tablets by mouth every six (6) hours as needed for Pain. 5/4/22   SEBLE Moctezuma   naproxen (NAPROSYN) 250 mg tablet Take 500 mg by mouth two (2) times daily (with meals). Shawanda Davenport MD   omeprazole (PRILOSEC) 20 mg capsule Take 20 mg by mouth daily. Shawanda Davenport MD   cyclobenzaprine (FLEXERIL) 10 mg tablet Take 10 mg by mouth. Shawanda Davenport MD   latanoprost (XALATAN) 0.005 % ophthalmic solution Administer 1 Drop to both eyes nightly.     Shawanda Davenport MD   cholecalciferol, vitamin D3, 2,000 unit tab Take  by mouth. Other, MD Shawanda   senna-docusate (PERICOLACE) 8.6-50 mg per tablet Take 1 Tab by mouth two (2) times a day. 12/28/12   Zachary Worrell NP   acetaminophen 650 mg Tab Take 650 mg by mouth every four (4) hours as needed for Pain. 12/28/12   Inessence Worrell NP   furosemide (LASIX) 40 mg tablet Take 40 mg by mouth daily. Provider, Historical   levothyroxine (SYNTHROID) 100 mcg tablet Take 100 mcg by mouth daily. Provider, Historical   lovastatin (MEVACOR) 20 mg tablet Take 20 mg by mouth daily. Provider, Historical   SENNOSIDES (SENNA LAX PO) Take  by mouth as needed.       Provider, Historical       Recent Results (from the past 24 hour(s))   ECHO ADULT COMPLETE    Collection Time: 05/12/22 10:14 AM   Result Value Ref Range    IVSd 1.5 (A) 0.6 - 0.9 cm    LVIDd 3.7 (A) 3.9 - 5.3 cm    LVIDs 2.7 cm    LVOT Diameter 2.1 cm    LVPWd 1.4 (A) 0.6 - 0.9 cm    LVOT Peak Gradient 3 mmHg    LVOT Peak Gradient 3 mmHg    LVOT Mean Gradient 2 mmHg    LVOT SV 65.1 ml    LVOT Peak Velocity 0.9 m/s    LVOT Peak Velocity 0.9 m/s    LVOT VTI 18.8 cm    RVIDd 4.4 cm    RVSP 19 mmHg    RV Free Wall Peak S' 20 cm/s    RVOT Peak Gradient 1 mmHg    RVOT Peak Velocity 0.5 m/s    LA Diameter 3.5 cm    LA Volume A/L 67 mL    LA Volume 2C 55 (A) 22 - 52 mL    LA Volume 4C 63 (A) 22 - 52 mL    Est. RA Pressure 3 mmHg    AV Cusp Mmode 1.3 cm    AV Area by Peak Velocity 1.6 cm2    AV Area by Peak Velocity 1.7 cm2    AV Area by Peak Velocity 1.7 cm2    AV Area by Peak Velocity 1.6 cm2    AV Area by VTI 1.6 cm2    AV Peak Gradient 16 mmHg    AV Peak Gradient 16 mmHg    AV Mean Gradient 9 mmHg    AV Peak Velocity 2.0 m/s    AV Peak Velocity 2.0 m/s    AV Mean Velocity 1.4 m/s    AV VTI 41.5 cm    MV A Velocity 0.85 m/s    MV E Wave Deceleration Time 177.0 ms    MV E Velocity 0.68 m/s    LV E' Lateral Velocity 4 cm/s    LV E' Septal Velocity 4 cm/s    MV Area by VTI 2.0 cm2    MV Peak Gradient 4 mmHg    MV Mean Gradient 2 mmHg    MV Max Velocity 1.0 m/s    MV Mean Velocity 0.6 m/s    MV VTI 33.0 cm    Pulmonary Artery EDP 3 mmHg    [place] Max Velocity 0.9 m/s    PV Peak Gradient 3 mmHg    PV Max Velocity 0.8 m/s    TAPSE 2.0 1.7 cm    TR Peak Gradient 16 mmHg    TR Max Velocity 1.97 m/s    Aortic Root 3.4 cm    Fractional Shortening 2D 27 28 - 44 %    LVIDd Index 2.08 cm/m2    LVIDs Index 1.52 cm/m2    LV RWT Ratio 0.76     LV Mass 2D 197.7 (A) 67 - 162 g    LV Mass 2D Index 111.0 (A) 43 - 95 g/m2    MV E/A 0.80     E/E' Ratio (Averaged) 17.00     E/E' Lateral 17.00     E/E' Septal 17.00     LA Volume Index A/L 38 16 - 34 mL/m2    LVOT Stroke Volume Index 36.6 mL/m2    LVOT Area 3.5 cm2    LA Volume Index 2C 31 16 - 34 mL/m2    LA Volume Index 4C 35 (A) 16 - 34 mL/m2    LA Size Index 1.97 cm/m2    LA/AO Root Ratio 1.03     Ao Root Index 1.91 cm/m2    LVOT:AV VTI Index 0.45     PHIL/BSA VTI 0.9 cm2/m2    MV:LVOT VTI Index 1.76

## 2022-05-13 NOTE — PROGRESS NOTES
Received notification from bedside RN about patient with regards to: difficult stick, unable to draw AM labs  VS: /56, HR 73, RR 18, O2 sat 91% on NC 4 L    Intervention given: arterial stick for lab ordered

## 2022-05-13 NOTE — PROGRESS NOTES
End of Shift Note     Bedside shift change report given to Cain An RN (oncoming nurse) by Danay Lomeli RN (offgoing nurse). Report included the following information SBAR, Kardex, ED Summary and MAR     Shift worked: 7-3    Shift summary and any significant changes:     Up in chair for two hours. Med with tylenol for headache with relief. Resp therapy called regarding need for flutter valve and xopenex           Concerns for physician to address: None     Zone phone for oncoming shift:  [phone]      Patient Information  Emeka Fields  80 y.o.  5/9/2022  4:47 PM by Amber Mays MD. Emeka Fields was admitted from Home     Problem List       Patient Active Problem List     Diagnosis Date Noted    CAP (community acquired pneumonia) 05/09/2022           Past Medical History:   Diagnosis Date    Arthritis      Breast cancer ([facility] [address] 1998     right breast    CAD (coronary artery disease)       2 heart attacks    Cancer ([facility] [address]       breast - treated with surgery    GERD (gastroesophageal reflux disease)       hiatal hernia    Other ill-defined conditions(799.89)       increased cholesterol    PUD (peptic ulcer disease)      Thyroid disease           Core Measures:  CVA: No Not applicable  CHF:No Not applicable  PNA:Yes Yes     Activity:  Activity Level: up with assist  Number times ambulated in hallways past shift: 0  Number of times OOB to chair past shift: 1     Cardiac:   Cardiac Monitoring: Yes         Access:   Current line(s): PIV   Central Line? No Placement date   PICC LINE? No Placement date      Genitourinary:   Urinary status: voiding, incontinent and external catheter  Urinary Catheter?  No Placement Date      Respiratory:   O2 Device: Nasal [id number]  Chronic home O2 use?: NO  Incentive spirometer at bedside: N/A     GI:  Last Bowel Movement Date: 05/11/22  Current diet:  ADULT DIET Regular; Low Fat/Low Chol/High Fiber/ARMINDA  Passing flatus: YES  Tolerating current diet: YES     Pain Management:   Patient states pain is manageable on current regimen: YES     Skin:  Elliot Score: 15  Interventions: turn team, speciality bed, float heels and increase time out of bed    Patient Safety:  Fall Score: Total Score: 4  Interventions: bed/chair alarm, gripper socks, pt to call before getting OOB and stay with me (per policy)  High Fall Risk:  Yes       DVT prophylaxis:  DVT prophylaxis Med- yes  DVT prophylaxis SCD or ORA- no      Wounds: (If Applicable)  Wounds- Yes  Location- [facility]      Active Consults:  None     Length of Stay:  Expected LOS: 2d 12h  Actual LOS: 2  Discharge Plan: Yes, SNF 5/16

## 2022-05-13 NOTE — PROGRESS NOTES
Problem: Mobility Impaired (Adult and Pediatric)  Goal: *Acute Goals and Plan of Care (Insert Text)  Description: FUNCTIONAL STATUS PRIOR TO ADMISSION: Patient reports living alone with 4 local children and a neighbor available to assist. Until recent falls, she reports being able to manage basic self-care tasks. RN indicates that she has been mostly in the bed over the past week (at home). HOME SUPPORT PRIOR TO ADMISSION: Neighbor and 4 local children    Physical Therapy Goals  Initiated 5/10/2022  1. Patient will move from supine to sit and sit to supine  and roll side to side in bed with minimal assistance/contact guard assist within 7 day(s). 2.  Patient will transfer from bed to chair and chair to bed with maximal assistance using the least restrictive device within 7 day(s). 3.  Patient will perform sit to stand with maximal assistance within 7 day(s). Outcome: Progressing Towards Goal   PHYSICAL THERAPY TREATMENT  Patient: Marin Chinchilla (29 y.o. female)  Date: 5/13/2022  Diagnosis: CAP (community acquired pneumonia) [J18.9] <principal problem not specified>       Precautions: Fall  Chart, physical therapy assessment, plan of care and goals were reviewed. ASSESSMENT  Patient continues with skilled PT services and is progressing towards goals. She is received on 4L/min O2 via nasal cannula. She demonstrates improved ability to participate this session as evidenced by decreased level assistance required for supine to sit. Patient demonstrates improved endurance and is able to tolerate upright sitting with SBA for several minutes and is agreeable to attempting to stand. O2 sats are 87-88% on 4L/min. Flow rate is increased to 5L/min for activity. VC are provided and patient is able to perform sit<>stand with Max Ax2. She stands very briefly with decreased hip/trunk extension and quickly requires a seated rest break. Sats rise post stand to 89-90%.  Patient is provided Max A x2 with HHA for stand pivot transfer to bedside chair. Current Level of Function Impacting Discharge (mobility/balance): Mod-Max Ax2 supine<>sit and stand pivot transfer, patient with fear of falling    Other factors to consider for discharge: medical stability, increased need for assistance, increased risk for falls, deconditioning         PLAN :  Patient continues to benefit from skilled intervention to address the above impairments. Continue treatment per established plan of care. to address goals. Recommendation for discharge: (in order for the patient to meet his/her long term goals)  Therapy up to 5 days/week in SNF setting    This discharge recommendation:  Has been made in collaboration with the attending provider and/or case management    IF patient discharges home will need the following DME: to be determined (TBD)       SUBJECTIVE:   Patient stated i'm worn out.     OBJECTIVE DATA SUMMARY:   Critical Behavior:  Neurologic State: Alert  Orientation Level: Oriented X4  Cognition: Follows commands     Functional Mobility Training:  Bed Mobility:     Supine to Sit: Maximum assistance;Bed Modified     Scooting: Maximum assistance        Transfers:  Sit to Stand: Maximum assistance;Assist x2  Stand to Sit: Maximum assistance;Assist x2        Bed to Chair: Maximum assistance;Assist x2                    Balance:  Sitting: Impaired  Sitting - Static: Fair (occasional)  Sitting - Dynamic: Not tested  Standing: Impaired; With support  Standing - Static: Constant support;Poor; Fair  Standing - Dynamic : Constant support; Fair  Ambulation/Gait Training:                                                        Stairs:               Therapeutic Exercises:     Pain Rating:      Activity Tolerance:   Fair and requires rest breaks    After treatment patient left in no apparent distress:   Sitting in chair, Call bell within reach, and Caregiver / family present    COMMUNICATION/COLLABORATION:   The patients plan of care was discussed with: Occupational therapist and Registered nurse.      Shelly Mcdonald, PT   Time Calculation: 29 mins

## 2022-05-13 NOTE — PROGRESS NOTES
ADULT PROTOCOL: JET AEROSOL  REASSESSMENT    Patient  Paulette Horner     80 y.o.   female     5/13/2022  5:50 PM    Breath Sounds Pre Procedure: Right Breath Sounds: Diminished                               Left Breath Sounds: Diminished    Breath Sounds Post Procedure: Right Breath Sounds: Diminished                                 Left Breath Sounds: Diminished    Breathing pattern: Pre procedure Breathing Pattern: Regular          Post procedure Breathing Pattern: Regular    Heart Rate: Pre procedure Pulse: 82           Post procedure Pulse: 82    Resp Rate: Pre procedure Respirations: 18           Post procedure Respirations: 18    Cough: Pre procedure Cough: Non-productive               Post procedure Cough: Non-productive    Sputum: Pre procedure                   Post procedure      Oxygen: O2 Device: Nasal cannula        Changed: no    SpO2: Pre procedure SpO2: 94 %                Post procedure SpO2: 94 %    Nebulizer Therapy: Current medications Aerosolized Medications: Xopenex      Changed: no    Problem List:   Patient Active Problem List   Diagnosis Code    CAP (community acquired pneumonia) J18.9       Respiratory Therapist: Mariaelena Charles

## 2022-05-14 LAB
ANION GAP SERPL CALC-SCNC: 3 MMOL/L (ref 5–15)
BUN SERPL-MCNC: 15 MG/DL (ref 6–20)
BUN/CREAT SERPL: 27 (ref 12–20)
CALCIUM SERPL-MCNC: 9.4 MG/DL (ref 8.5–10.1)
CHLORIDE SERPL-SCNC: 98 MMOL/L (ref 97–108)
CO2 SERPL-SCNC: 31 MMOL/L (ref 21–32)
CREAT SERPL-MCNC: 0.56 MG/DL (ref 0.55–1.02)
GLUCOSE SERPL-MCNC: 177 MG/DL (ref 65–100)
MAGNESIUM SERPL-MCNC: 2.3 MG/DL (ref 1.6–2.4)
POTASSIUM SERPL-SCNC: 4.9 MMOL/L (ref 3.5–5.1)
SODIUM SERPL-SCNC: 132 MMOL/L (ref 136–145)

## 2022-05-14 PROCEDURE — 80048 BASIC METABOLIC PNL TOTAL CA: CPT

## 2022-05-14 PROCEDURE — 74011000250 HC RX REV CODE- 250: Performed by: STUDENT IN AN ORGANIZED HEALTH CARE EDUCATION/TRAINING PROGRAM

## 2022-05-14 PROCEDURE — 74011250637 HC RX REV CODE- 250/637: Performed by: STUDENT IN AN ORGANIZED HEALTH CARE EDUCATION/TRAINING PROGRAM

## 2022-05-14 PROCEDURE — 77030038269 HC DRN EXT URIN PURWCK BARD -A

## 2022-05-14 PROCEDURE — 74011250636 HC RX REV CODE- 250/636: Performed by: STUDENT IN AN ORGANIZED HEALTH CARE EDUCATION/TRAINING PROGRAM

## 2022-05-14 PROCEDURE — 74011250637 HC RX REV CODE- 250/637: Performed by: INTERNAL MEDICINE

## 2022-05-14 PROCEDURE — 83735 ASSAY OF MAGNESIUM: CPT

## 2022-05-14 PROCEDURE — 74011250636 HC RX REV CODE- 250/636: Performed by: HOSPITALIST

## 2022-05-14 PROCEDURE — 65270000046 HC RM TELEMETRY

## 2022-05-14 PROCEDURE — 74011250636 HC RX REV CODE- 250/636: Performed by: INTERNAL MEDICINE

## 2022-05-14 RX ORDER — LEVALBUTEROL INHALATION SOLUTION 1.25 MG/3ML
1.25 SOLUTION RESPIRATORY (INHALATION)
Status: DISCONTINUED | OUTPATIENT
Start: 2022-05-14 | End: 2022-05-17 | Stop reason: HOSPADM

## 2022-05-14 RX ADMIN — PANTOPRAZOLE SODIUM 40 MG: 40 TABLET, DELAYED RELEASE ORAL at 07:51

## 2022-05-14 RX ADMIN — ENOXAPARIN SODIUM 40 MG: 100 INJECTION SUBCUTANEOUS at 09:50

## 2022-05-14 RX ADMIN — CASTOR OIL AND BALSAM, PERU: 788; 87 OINTMENT TOPICAL at 22:25

## 2022-05-14 RX ADMIN — FUROSEMIDE 40 MG: 40 TABLET ORAL at 09:50

## 2022-05-14 RX ADMIN — METHYLPREDNISOLONE SODIUM SUCCINATE 20 MG: 40 INJECTION, POWDER, FOR SOLUTION INTRAMUSCULAR; INTRAVENOUS at 09:49

## 2022-05-14 RX ADMIN — METHYLPREDNISOLONE SODIUM SUCCINATE 20 MG: 40 INJECTION, POWDER, FOR SOLUTION INTRAMUSCULAR; INTRAVENOUS at 22:21

## 2022-05-14 RX ADMIN — SODIUM CHLORIDE, PRESERVATIVE FREE 10 ML: 5 INJECTION INTRAVENOUS at 22:22

## 2022-05-14 RX ADMIN — METHYLPREDNISOLONE SODIUM SUCCINATE 20 MG: 40 INJECTION, POWDER, FOR SOLUTION INTRAMUSCULAR; INTRAVENOUS at 02:06

## 2022-05-14 RX ADMIN — POTASSIUM CHLORIDE 40 MEQ: 750 TABLET, EXTENDED RELEASE ORAL at 09:50

## 2022-05-14 RX ADMIN — LATANOPROST 1 DROP: 50 SOLUTION OPHTHALMIC at 22:25

## 2022-05-14 RX ADMIN — CASTOR OIL AND BALSAM, PERU: 788; 87 OINTMENT TOPICAL at 09:51

## 2022-05-14 RX ADMIN — SODIUM CHLORIDE, PRESERVATIVE FREE 10 ML: 5 INJECTION INTRAVENOUS at 14:40

## 2022-05-14 RX ADMIN — SODIUM CHLORIDE, PRESERVATIVE FREE 10 ML: 5 INJECTION INTRAVENOUS at 05:37

## 2022-05-14 RX ADMIN — LEVOTHYROXINE SODIUM 100 MCG: 0.1 TABLET ORAL at 05:23

## 2022-05-14 NOTE — PROGRESS NOTES
Problem: Falls - Risk of  Goal: *Absence of Falls  Description: Document Sahil Saxena Fall Risk and appropriate interventions in the flowsheet. Outcome: Progressing Towards Goal  Note: Fall Risk Interventions:  Mobility Interventions: Patient to call before getting OOB,PT Consult for mobility concerns,PT Consult for assist device competence,OT consult for ADLs,Bed/chair exit alarm    Mentation Interventions: Bed/chair exit alarm,Adequate sleep, hydration, pain control    Medication Interventions: Bed/chair exit alarm,Evaluate medications/consider consulting pharmacy,Patient to call before getting OOB,Teach patient to arise slowly    Elimination Interventions: Call light in reach,Patient to call for help with toileting needs,Stay With Me (per policy),Toileting schedule/hourly rounds    History of Falls Interventions: Evaluate medications/consider consulting pharmacy,Door open when patient unattended         Problem: Pressure Injury - Risk of  Goal: *Prevention of pressure injury  Description: Document Elliot Scale and appropriate interventions in the flowsheet. Outcome: Progressing Towards Goal  Note: Pressure Injury Interventions:  Sensory Interventions: Assess changes in LOC,Float heels,Keep linens dry and wrinkle-free,Maintain/enhance activity level,Minimize linen layers    Moisture Interventions: Absorbent underpads,Check for incontinence Q2 hours and as needed,Internal/External urinary devices,Maintain skin hydration (lotion/cream),Minimize layers    Activity Interventions: Increase time out of bed,Pressure redistribution bed/mattress(bed type),PT/OT evaluation    Mobility Interventions: HOB 30 degrees or less,Pressure redistribution bed/mattress (bed type),PT/OT evaluation,Turn and reposition approx.  every two hours(pillow and wedges)    Nutrition Interventions: Document food/fluid/supplement intake,Offer support with meals,snacks and hydration    Friction and Shear Interventions: Lift sheet,Minimize layers Problem: General Medical Care Plan  Goal: *Vital signs within specified parameters  Outcome: Progressing Towards Goal  Goal: *Labs within defined limits  Outcome: Progressing Towards Goal  Goal: *Absence of infection signs and symptoms  Outcome: Progressing Towards Goal  Goal: *Optimal pain control at patient's stated goal  Outcome: Progressing Towards Goal  Goal: *Skin integrity maintained  Outcome: Progressing Towards Goal  Goal: *Fluid volume balance  Outcome: Progressing Towards Goal  Goal: *Optimize nutritional status  Outcome: Progressing Towards Goal  Goal: *Anxiety reduced or absent  Outcome: Progressing Towards Goal  Goal: *Progressive mobility and function (eg: ADL's)  Outcome: Progressing Towards Goal     Problem: General Infection Care Plan (Adult and Pediatric)  Goal: Improvement in signs and symptoms of infection  Outcome: Progressing Towards Goal  Goal: *Optimize nutritional status  Outcome: Progressing Towards Goal

## 2022-05-14 NOTE — PROGRESS NOTES
Hospitalist Progress Note    NAME: Manny Crockett   :  1928   MRN:  [id number]       Assessment / Plan:  Asthmatic bronchitis / CAP? Recent fall  Deconditioning  Right hip pain and right shoulder pain and weakness from recent fall. CT pelvis negative for fracture  x-ray right shoulder No acute abnormality. Osteopenia and degenerative changes  CT head was ordered, but do not feel it is necessary at this time as patient is mentating and she would need a sedative to undergo this test which is not ideal in this 79 yo  Doppler negative for DVT  CTA chest  1. No evidence pulmonary embolism. 2. Small right pleural effusion. Bibasilar patchy airspace consolidation. 3. Several nonspecific right lung subcentimeter nodular densities. Consider  short interval follow-up. 4. Moderate-sized hiatal hernia.     Abx was stopped as PCT negative. COVID-19 negative  Echo EF 55-60%, mild AS, trace MR, RV normal  -She feels congested and has bibasal crackles and peripheral edema on exam.   ProBNP elevated. Received IV bumex from - but did not really improve her respiratory symptoms. Breathing better on 3 L now was on 5 L yesterday not coughing much  -switch back to her PTA oral lasix 40  -started on  IV solumedrol 20 q8  Will taper to q 12 hours  -acapella flutter valve with xopenex nebs  -discussed with Dr Catalina Garcia, given Echo, lack of response to IV bumex and sounding more wheezy today, this is likely asthmatic bronchitis probably triggered by initial CAP  -wean oxygen  -PT OT consulted - recommending SNF     CAD  GERD  Hypothyroidism  Resume home meds     Hard of hearing     Code Status: DNR, confirmed by daughter at the bedside  Surrogate Decision Maker: Her son and daughter     DVT Prophylaxis: Lovenox  GI Prophylaxis: not indicated     Baseline: Was able to ambulate with a walker prior to last week. 18.5 - 24.9 Normal weight / Body mass index is 25.83 kg/m².     Recommended Disposition: SNF/LTC Subjective:     Chief Complaint / Reason for Physician Visit  Follow up wheezing, cough, congestion, bronchitis      Review of Systems:  Symptom Y/N Comments  Symptom Y/N Comments   Fever/Chills    Chest Pain     Poor Appetite    Edema     Cough y   Abdominal Pain     Sputum y   Joint Pain     SOB/GUTHRIE y   Pruritis/Rash     Nausea/vomit    Tolerating PT/OT     Diarrhea    Tolerating Diet     Constipation    Other       Could NOT obtain due to:      Objective:     VITALS:   Last 24hrs VS reviewed since prior progress note. Most recent are:  Patient Vitals for the past 24 hrs:   Temp Pulse Resp BP SpO2   05/14/22 0847 98.4 °F (36.9 °C) 63 18 (!) 147/78 96 %   05/14/22 0300 97.7 °F (36.5 °C) 72 19 (!) 141/65 93 %   05/13/22 1959 97 °F (36.1 °C) 74 19 (!) 146/75 92 %   05/13/22 1930 97.1 °F (36.2 °C) 79 19 (!) 111/57 91 %   05/13/22 1742 98.6 °F (37 °C) 82 20 139/62 93 %   05/13/22 1604 98.4 °F (36.9 °C) 84 20 134/66 94 %   05/13/22 1245 98.2 °F (36.8 °C) 67 20 134/63 95 %     No intake or output data in the 24 hours ending 05/14/22 0856     I had a face to face encounter and independently examined this patient on 5/14/2022, as outlined below:  PHYSICAL EXAM:  General: WD, WN. Alert, cooperative, no acute distress, frail appearing   EENT:  EOMI. Anicteric sclerae. MMM  Resp:  CTA bilaterally, no wheezing or rales. No accessory muscle use  CV:  Regular  rhythm,  No edema  GI:  Soft, Non distended, Non tender. +Bowel sounds  Neurologic:  Alert and oriented X 3, normal speech,   Psych:   Not anxious nor agitated  Skin:  No rashes.   No jaundice    Reviewed most current lab test results and cultures  YES  Reviewed most current radiology test results   YES  Review and summation of old records today    NO  Reviewed patient's current orders and MAR    YES  PMH/SH reviewed - no change compared to H&P  ________________________________________________________________________  Care Plan discussed with:    Comments   Patient x Family  x daughter   RN x    Care Manager     Consultant                        Multidiciplinary team rounds were held today with , nursing, pharmacist and clinical coordinator. Patient's plan of care was discussed; medications were reviewed and discharge planning was addressed. ________________________________________________________________________  Total NON critical care TIME:  25  Minutes    Total CRITICAL CARE TIME Spent:   Minutes non procedure based      Comments   >50% of visit spent in counseling and coordination of care     ________________________________________________________________________  Michelle Weeks MD     Procedures: see electronic medical records for all procedures/Xrays and details which were not copied into this note but were reviewed prior to creation of Plan. LABS:  I reviewed today's most current labs and imaging studies.   Pertinent labs include:  Recent Labs     05/13/22  0919   WBC 8.3   HGB 11.8   HCT 35.9        Recent Labs     05/14/22  0520 05/13/22  0930 05/12/22  0232   *  --  138   K 4.9  --  3.4*   CL 98  --  103   CO2 31  --  32   *  --  95   BUN 15  --  7   CREA 0.56  --  0.47*   CA 9.4  --  8.9   MG 2.3 1.9 1.9       Signed: Michelle Weeks MD

## 2022-05-14 NOTE — PROGRESS NOTES
Bedside and Verbal shift change report given to [facility] 58 (oncoming nurse) by KIANA Webb RN (offgoing nurse). Report given with SBAR, Kardex, Intake/Output, MAR and Recent Results.

## 2022-05-14 NOTE — PROGRESS NOTES
Spiritual Care Assessment/Progress Note  [facility]      NAME: Samantha Jiang      MRN: [id number]  AGE: 80 y.o.  SEX: female  Taoism Affiliation: Confucianism   Language: English     5/14/2022     Total Time (in minutes): 12     Spiritual Assessment begun in [facility] TELE[facility] through conversation with:         [x]Patient        [x] Family    [] Friend(s)        Reason for Consult: Initial/Spiritual assessment, patient floor     Spiritual beliefs: (Please include comment if needed)     [] Identifies with a levon tradition:         [] Supported by a levon community:            [] Claims no spiritual orientation:           [] Seeking spiritual identity:                [] Adheres to an individual form of spirituality:           [x] Not able to assess:  Did not indicate                       Identified resources for coping:      [] Prayer                               [] Music                  [] Guided Imagery     [x] Family/friends                 [] Pet visits     [] Devotional reading                         [] Unknown     [] Other:                                               Interventions offered during this visit: (See comments for more details)    Patient Interventions: Initial/Spiritual assessment, patient floor     Family/Friend(s): Initial Assessment     Plan of Care:     [x] Support spiritual and/or cultural needs    [] Support AMD and/or advance care planning process      [] Support grieving process   [] Coordinate Rites and/or Rituals    [] Coordination with community clergy   [] No spiritual needs identified at this time   [] Detailed Plan of Care below (See Comments)  [] Make referral to Music Therapy  [] Make referral to Pet Therapy     [] Make referral to Addiction services  [] Make referral to [facility]  [] Make referral to Spiritual Care Partner  [] No future visits requested        [x] Contact Spiritual Care for further referrals     Comments:   Reviewed chart prior to visit on Neuro unit for spiritual assessment. Patient's daughter was at bedside, speaking to someone on the phone. She paused and greeted me; introduced myself and role. She expressed appreciation for check-in.    available upon referral by nurse or by patient/family request.       ABENA Krause, [facility], Staff   [facility] Paging Service  [phone])

## 2022-05-14 NOTE — PROGRESS NOTES
Cardiology Progress Note  5/14/2022     Admit Date: 5/9/2022  Admit Diagnosis: CAP (community acquired pneumonia) [J18.9]  CC: none currently  Cardiac Assessment/Plan (per Dr Viki Garnett):                 Assessment:       81 yo admitted with persistent cough and SOB         She does not have CHF . She does not have Diastolic heart failure. She has no findings to support diagnosis of CHF . She had no findings of pulmonary edema on admission , by CXR or by CT scan . By echo she has normal LV function with EF 55 - 60 %     The pro BNP is minimally elevated at 1030 , which is normal for age ,  With the cutoff for age > 76 yrs being 18.        Likely has had a viral URI and takes time to resolve.      Aortic Sclerosis / mild Stenosis . Non significant   Thought to have mild coronary atherosclerosis ,  Remote nuclear showed apical defect. No cath or intervention      HTN   Decubitus ulcer reported. Leg edema , resolved.                       Plan:        No need for heart failure meds. Low dose diuresis as needed to manage edema   May benefit from jet nebs, steroid   I will d/c Statin . No need for statin at her age. Mobilize as tolerated . She is been supine in bed for 2 weeks , at home and now here.         5/14: No CP/less dyspnea; bronchitis Rx ongoing. BPs 110-140s; HR 60-70s; NSR; 93% 3L  Na 132; Cr 0.6; K 4.9. Cardiac meds: lasix 40; kcl 40. Stable cardiac status; No active cardiac issues; will sign-off; Please call if questions/issues. Thanks. For other plans, see orders.   Hospital problem list     Active Hospital Problems    Diagnosis Date Noted    CAP (community acquired pneumonia) 05/09/2022        Subjective: Ash Peñaloza reports       Chest pain X none  consistent with:  Non-cardiac CP         Atypical CP     None now  On going  Anginal CP     Dyspnea X none  at rest  with exertion         improved  unchanged  worse              PND X none overnight       Orthopnea X none  improved  unchanged  worse   Presyncope X none  improved  unchanged  worse     Ambulated in hallway without symptoms   Yes   Ambulated in room without symptoms  Yes   Objective:     Physical Exam:  Overall VSSAF;    Visit Vitals  /63   Pulse 66   Temp 98.2 °F (36.8 °C)   Resp 18   Ht 5' 6\" (1.676 m)   Wt 72.6 kg (160 lb 0.9 oz)   SpO2 93%   BMI 25.83 kg/m²     Temp (24hrs), Av.9 °F (36.6 °C), Min:97 °F (36.1 °C), Max:98.6 °F (37 °C)    Patient Vitals for the past 8 hrs:   Pulse   22 1228 66   22 0847 63     Patient Vitals for the past 8 hrs:   Resp   22 1228 18   22 0847 18     Patient Vitals for the past 8 hrs:   BP   22 1228 134/63   22 0847 (!) 147/78     No intake or output data in the 24 hours ending 22 1414  General Appearance: Well developed, well nourished, no acute distress. Ears/Nose/Mouth/Throat:   Normal MM; anicteric. JVP: WNL   Resp:   Lungs scattered rhonchi. Nl resp effort. Cardiovascular:  RRR, S1, S2 normal, no new murmur. No gallop or rub. Abdomen:   Soft, non-tender, bowel sounds are present. Extremities: No edema bilaterally. Skin:  Neuro: Warm and dry. A/O x3, grossly nonfocal       cath site intact w/o hematoma or new bruit; distal pulse unchanged  Yes   Data Review:     Telemetry independently reviewed x sinus  chronic afib  parox afib  NSVT     ECG independently reviewed  NSR  afib  no significant changes  NSST-Tw chgs     x no new ECG provided for review   Lab results reviewed as noted below. Current medications reviewed as noted below. No results for input(s): PH, PCO2, PO2 in the last 72 hours. No results for input(s): CPK, CKMB, CKNDX, TROIQ in the last 72 hours.   Recent Labs     22  0520 22  0919 22  0232   *  --  138   K 4.9  --  3.4*   CL 98  --  103   CO2 31  --  32   BUN 15  --  7   CREA 0.56  --  0.47*   GFRAA >60  --  >60   *  --  95   CA 9.4  -- 8. 9   WBC  --  8.3  --    HGB  --  11.8  --    HCT  --  35.9  --    PLT  --  335  --      No results found for: CHOL, CHOLX, CHLST, CHOLV, HDL, HDLP, LDL, LDLC, DLDLP, TGLX, TRIGL, TRIGP, CHHD, CHHDX  No results for input(s): AP, TBIL, TP, ALB, GLOB, GGT, AML, LPSE in the last 72 hours. No lab exists for component: SGOT, GPT, AMYP, HLPSE  No results for input(s): INR, PTP, APTT, INREXT in the last 72 hours.    No components found for: GLPOC    Current Facility-Administered Medications   Medication Dose Route Frequency    levalbuterol (XOPENEX) nebulizer soln 1.25 mg/3 mL  1.25 mg Nebulization Q6H PRN    methylPREDNISolone (PF) (SOLU-MEDROL) injection 20 mg  20 mg IntraVENous Q12H    furosemide (LASIX) tablet 40 mg  40 mg Oral DAILY    potassium chloride SR (KLOR-CON 10) tablet 40 mEq  40 mEq Oral DAILY    balsam peru-castor oiL (VENELEX) ointment   Topical BID    traMADoL (ULTRAM) tablet 50 mg  50 mg Oral Q6H PRN    albuterol-ipratropium (DUO-NEB) 2.5 MG-0.5 MG/3 ML  3 mL Nebulization Q4H PRN    hydrALAZINE (APRESOLINE) 20 mg/mL injection 10 mg  10 mg IntraVENous Q4H PRN    latanoprost (XALATAN) 0.005 % ophthalmic solution 1 Drop  1 Drop Both Eyes QHS    pantoprazole (PROTONIX) tablet 40 mg  40 mg Oral ACB    sodium chloride (NS) flush 5-40 mL  5-40 mL IntraVENous Q8H    sodium chloride (NS) flush 5-40 mL  5-40 mL IntraVENous PRN    acetaminophen (TYLENOL) tablet 650 mg  650 mg Oral Q6H PRN    Or    acetaminophen (TYLENOL) suppository 650 mg  650 mg Rectal Q6H PRN    polyethylene glycol (MIRALAX) packet 17 g  17 g Oral DAILY PRN    ondansetron (ZOFRAN ODT) tablet 4 mg  4 mg Oral Q8H PRN    Or    ondansetron (ZOFRAN) injection 4 mg  4 mg IntraVENous Q6H PRN    enoxaparin (LOVENOX) injection 40 mg  40 mg SubCUTAneous DAILY    levothyroxine (SYNTHROID) tablet 100 mcg  100 mcg Oral 6am        Shauna Wilson MD

## 2022-05-14 NOTE — PROGRESS NOTES
Bedside shift change report given to Rosa Alex RN  (oncoming nurse) by Juanito Griffin RN (offgoing nurse).   Report included the following information SBAR, Kardex, ED Summary, Intake/Output and MAR

## 2022-05-14 NOTE — PROGRESS NOTES
End of Shift Note    Bedside shift change report given to Anderson Mcmillan (oncoming nurse) by Michel Evans RN (offgoing nurse). Report included the following information SBAR    Shift worked:  7a - 3p     Shift summary and any significant changes:     weaned o2 to 3L. Concerns for physician to address:  None     Zone phone for oncoming shift:   [phone]       Activity:  Activity Level: Bed Rest  Number times ambulated in hallways past shift: 0  Number of times OOB to chair past shift: 0    Cardiac:   Cardiac Monitoring: Yes      Cardiac Rhythm: 1\" AV Block    Access:   Current line(s): PIV     Genitourinary:   Urinary status: external catheter    Respiratory:   O2 Device: Nasal cannula  Chronic home O2 use?: NO  Incentive spirometer at bedside: NO       GI:  Last Bowel Movement Date: 05/11/22  Current diet:  ADULT DIET Easy to Chew; Low Fat/Low Chol/High Fiber/ARMINDA  Passing flatus: YES  Tolerating current diet: YES       Pain Management:   Patient states pain is manageable on current regimen: N/A    Skin:  Elliot Score: 14  Interventions: float heels, PT/OT consult and internal/external urinary devices    Patient Safety:  Fall Score:  Total Score: 3  Interventions: bed/chair alarm, assistive device (walker, cane, etc), gripper socks, pt to call before getting OOB and stay with me (per policy)   High Fall Risk: Yes    Length of Stay:  Expected LOS: 2d 12h  Actual LOS: [facility] [address] Lizzette Price RN

## 2022-05-15 LAB
ANION GAP SERPL CALC-SCNC: 3 MMOL/L (ref 5–15)
BASOPHILS # BLD: 0 K/UL (ref 0–0.1)
BASOPHILS NFR BLD: 0 % (ref 0–1)
BLASTS NFR BLD MANUAL: 0 %
BUN SERPL-MCNC: 21 MG/DL (ref 6–20)
BUN/CREAT SERPL: 41 (ref 12–20)
CALCIUM SERPL-MCNC: 9.2 MG/DL (ref 8.5–10.1)
CHLORIDE SERPL-SCNC: 98 MMOL/L (ref 97–108)
CO2 SERPL-SCNC: 30 MMOL/L (ref 21–32)
CREAT SERPL-MCNC: 0.51 MG/DL (ref 0.55–1.02)
DIFFERENTIAL METHOD BLD: ABNORMAL
EOSINOPHIL # BLD: 0 K/UL (ref 0–0.4)
EOSINOPHIL NFR BLD: 0 % (ref 0–7)
ERYTHROCYTE [DISTWIDTH] IN BLOOD BY AUTOMATED COUNT: 12.7 % (ref 11.5–14.5)
GLUCOSE SERPL-MCNC: 160 MG/DL (ref 65–100)
HCT VFR BLD AUTO: 33.7 % (ref 35–47)
HGB BLD-MCNC: 10.9 G/DL (ref 11.5–16)
IMM GRANULOCYTES # BLD AUTO: 0 K/UL
IMM GRANULOCYTES NFR BLD AUTO: 0 %
LYMPHOCYTES # BLD: 0.8 K/UL (ref 0.8–3.5)
LYMPHOCYTES NFR BLD: 9 % (ref 12–49)
MCH RBC QN AUTO: 31.3 PG (ref 26–34)
MCHC RBC AUTO-ENTMCNC: 32.3 G/DL (ref 30–36.5)
MCV RBC AUTO: 96.8 FL (ref 80–99)
METAMYELOCYTES NFR BLD MANUAL: 0 %
MONOCYTES # BLD: 0.3 K/UL (ref 0–1)
MONOCYTES NFR BLD: 3 % (ref 5–13)
MYELOCYTES NFR BLD MANUAL: 0 %
NEUTS BAND NFR BLD MANUAL: 0 % (ref 0–6)
NEUTS SEG # BLD: 8.3 K/UL (ref 1.8–8)
NEUTS SEG NFR BLD: 88 % (ref 32–75)
NRBC # BLD: 0 K/UL (ref 0–0.01)
NRBC BLD-RTO: 0 PER 100 WBC
OTHER CELLS NFR BLD MANUAL: 0 %
PLATELET # BLD AUTO: 414 K/UL (ref 150–400)
PMV BLD AUTO: 8.9 FL (ref 8.9–12.9)
POTASSIUM SERPL-SCNC: 4.8 MMOL/L (ref 3.5–5.1)
PROMYELOCYTES NFR BLD MANUAL: 0 %
RBC # BLD AUTO: 3.48 M/UL (ref 3.8–5.2)
RBC MORPH BLD: ABNORMAL
RBC MORPH BLD: ABNORMAL
SODIUM SERPL-SCNC: 131 MMOL/L (ref 136–145)
WBC # BLD AUTO: 9.4 K/UL (ref 3.6–11)

## 2022-05-15 PROCEDURE — 80048 BASIC METABOLIC PNL TOTAL CA: CPT

## 2022-05-15 PROCEDURE — 74011250637 HC RX REV CODE- 250/637: Performed by: STUDENT IN AN ORGANIZED HEALTH CARE EDUCATION/TRAINING PROGRAM

## 2022-05-15 PROCEDURE — 74011250637 HC RX REV CODE- 250/637: Performed by: INTERNAL MEDICINE

## 2022-05-15 PROCEDURE — 85027 COMPLETE CBC AUTOMATED: CPT

## 2022-05-15 PROCEDURE — 74011000250 HC RX REV CODE- 250: Performed by: STUDENT IN AN ORGANIZED HEALTH CARE EDUCATION/TRAINING PROGRAM

## 2022-05-15 PROCEDURE — 74011250636 HC RX REV CODE- 250/636: Performed by: STUDENT IN AN ORGANIZED HEALTH CARE EDUCATION/TRAINING PROGRAM

## 2022-05-15 PROCEDURE — 65270000046 HC RM TELEMETRY

## 2022-05-15 PROCEDURE — 74011250636 HC RX REV CODE- 250/636: Performed by: HOSPITALIST

## 2022-05-15 RX ADMIN — LATANOPROST 1 DROP: 50 SOLUTION OPHTHALMIC at 21:19

## 2022-05-15 RX ADMIN — CASTOR OIL AND BALSAM, PERU: 788; 87 OINTMENT TOPICAL at 21:19

## 2022-05-15 RX ADMIN — PANTOPRAZOLE SODIUM 40 MG: 40 TABLET, DELAYED RELEASE ORAL at 09:57

## 2022-05-15 RX ADMIN — SODIUM CHLORIDE, PRESERVATIVE FREE 10 ML: 5 INJECTION INTRAVENOUS at 21:19

## 2022-05-15 RX ADMIN — LEVOTHYROXINE SODIUM 100 MCG: 0.1 TABLET ORAL at 05:28

## 2022-05-15 RX ADMIN — METHYLPREDNISOLONE SODIUM SUCCINATE 20 MG: 40 INJECTION, POWDER, FOR SOLUTION INTRAMUSCULAR; INTRAVENOUS at 21:20

## 2022-05-15 RX ADMIN — FUROSEMIDE 40 MG: 40 TABLET ORAL at 09:57

## 2022-05-15 RX ADMIN — ENOXAPARIN SODIUM 40 MG: 100 INJECTION SUBCUTANEOUS at 09:57

## 2022-05-15 RX ADMIN — POTASSIUM CHLORIDE 20 MEQ: 750 TABLET, EXTENDED RELEASE ORAL at 12:28

## 2022-05-15 RX ADMIN — SODIUM CHLORIDE, PRESERVATIVE FREE 10 ML: 5 INJECTION INTRAVENOUS at 05:31

## 2022-05-15 RX ADMIN — SODIUM CHLORIDE, PRESERVATIVE FREE 10 ML: 5 INJECTION INTRAVENOUS at 12:28

## 2022-05-15 RX ADMIN — METHYLPREDNISOLONE SODIUM SUCCINATE 20 MG: 40 INJECTION, POWDER, FOR SOLUTION INTRAMUSCULAR; INTRAVENOUS at 09:57

## 2022-05-15 RX ADMIN — CASTOR OIL AND BALSAM, PERU: 788; 87 OINTMENT TOPICAL at 09:56

## 2022-05-15 NOTE — PROGRESS NOTES
Problem: Falls - Risk of  Goal: *Absence of Falls  Description: Document Kvng Babin Fall Risk and appropriate interventions in the flowsheet. Outcome: Progressing Towards Goal  Note: Fall Risk Interventions:  Mobility Interventions: Assess mobility with egress test,Bed/chair exit alarm,Patient to call before getting OOB    Mentation Interventions: Adequate sleep, hydration, pain control,Bed/chair exit alarm,Gait belt with transfers/ambulation    Medication Interventions: Bed/chair exit alarm,Patient to call before getting OOB    Elimination Interventions: Bed/chair exit alarm,Call light in reach,Toileting schedule/hourly rounds    History of Falls Interventions:  Investigate reason for fall         Problem: Patient Education: Go to Patient Education Activity  Goal: Patient/Family Education  Outcome: Progressing Towards Goal

## 2022-05-15 NOTE — PROGRESS NOTES
Hospitalist Progress Note    NAME: Lakisha Guzman   :  1928   MRN:  [id number]       Assessment / Plan:  Asthmatic bronchitis / CAP? Recent fall  Deconditioning  Right hip pain and right shoulder pain and weakness from recent fall. CT pelvis negative for fracture  x-ray right shoulder No acute abnormality. Osteopenia and degenerative changes  CT head was ordered, but do not feel it is necessary at this time as patient is mentating and she would need a sedative to undergo this test which is not ideal in this 81 yo  Doppler negative for DVT  CTA chest  1. No evidence pulmonary embolism. 2. Small right pleural effusion. Bibasilar patchy airspace consolidation. 3. Several nonspecific right lung subcentimeter nodular densities. Consider  short interval follow-up. 4. Moderate-sized hiatal hernia.     Abx was stopped as PCT negative. COVID-19 negative  Echo EF 55-60%, mild AS, trace MR, RV normal  -She feels congested and has bibasal crackles and peripheral edema on exam.   ProBNP elevated. Received IV bumex from - but did not really improve her respiratory symptoms. Breathing better on 3 L now was on 5 L yesterday not coughing much  -switch back to her PTA oral lasix 40  -started on  IV solumedrol 20 q8  Will taper to q 12 hours. She is drinking tea in semi sitting position tea went wrong way per family so now coughing. I told family not to feed in this position.  Family denies any problem with swallowing at home.   -acapella flutter valve with xopenex nebs  -discussed with Dr Nickolas Muñoz, given Echo, lack of response to IV bumex and sounding more wheezy today, this is likely asthmatic bronchitis probably triggered by initial CAP  -wean oxygen  -PT OT consulted - recommending SNF     CAD  GERD  Hypothyroidism  Resume home meds     Hard of hearing     Code Status: DNR, confirmed by daughter at the bedside  Surrogate Decision Maker: Her son and daughter     DVT Prophylaxis: Lovenox  GI Prophylaxis: not indicated     Baseline: Was able to ambulate with a walker prior to last week. 18.5 - 24.9 Normal weight / Body mass index is 25.83 kg/m². Recommended Disposition: SNF/LTC     Subjective:     Chief Complaint / Reason for Physician Visit  Follow up wheezing, cough, congestion, bronchitis. Cough is better now. Review of Systems:  Symptom Y/N Comments  Symptom Y/N Comments   Fever/Chills    Chest Pain     Poor Appetite    Edema     Cough y   Abdominal Pain     Sputum y   Joint Pain     SOB/GUTHRIE y   Pruritis/Rash     Nausea/vomit    Tolerating PT/OT     Diarrhea    Tolerating Diet     Constipation    Other       Could NOT obtain due to:      Objective:     VITALS:   Last 24hrs VS reviewed since prior progress note. Most recent are:  Patient Vitals for the past 24 hrs:   Temp Pulse Resp BP SpO2   05/15/22 0805 97.4 °F (36.3 °C) 72 20 (!) 171/81 92 %   05/15/22 0300 97.8 °F (36.6 °C) 75 18 139/74 91 %   05/14/22 2317 98.9 °F (37.2 °C) 77 18 134/77 93 %   05/14/22 1955 98 °F (36.7 °C) 72 16 131/72 92 %   05/14/22 1553 97.9 °F (36.6 °C) 66 18 (!) 140/65 90 %   05/14/22 1228 98.2 °F (36.8 °C) 66 18 134/63 93 %       Intake/Output Summary (Last 24 hours) at 5/15/2022 1032  Last data filed at 5/15/2022 1007  Gross per 24 hour   Intake --   Output 450 ml   Net -450 ml        I had a face to face encounter and independently examined this patient on 5/15/2022, as outlined below:  PHYSICAL EXAM:  General: WD, WN. Alert, cooperative, no acute distress, frail appearing   EENT:  EOMI. Anicteric sclerae. MMM  Resp:  CTA bilaterally, no wheezing or rales. No accessory muscle use  CV:  Regular  rhythm,  No edema  GI:  Soft, Non distended, Non tender. +Bowel sounds  Neurologic:  Alert and oriented X 3, normal speech,   Psych:   Not anxious nor agitated  Skin:  No rashes.   No jaundice    Reviewed most current lab test results and cultures  YES  Reviewed most current radiology test results   YES  Review and summation of old records today    NO  Reviewed patient's current orders and MAR    YES  PMH/SH reviewed - no change compared to H&P  ________________________________________________________________________  Care Plan discussed with:    Comments   Patient x    Family  x daughter   RN x    Care Manager     Consultant                        Multidiciplinary team rounds were held today with , nursing, pharmacist and clinical coordinator. Patient's plan of care was discussed; medications were reviewed and discharge planning was addressed. ________________________________________________________________________  Total NON critical care TIME:  25  Minutes    Total CRITICAL CARE TIME Spent:   Minutes non procedure based      Comments   >50% of visit spent in counseling and coordination of care     ________________________________________________________________________  Peace Delgado MD     Procedures: see electronic medical records for all procedures/Xrays and details which were not copied into this note but were reviewed prior to creation of Plan. LABS:  I reviewed today's most current labs and imaging studies.   Pertinent labs include:  Recent Labs     05/15/22  0645 05/13/22  0919   WBC 9.4 8.3   HGB 10.9* 11.8   HCT 33.7* 35.9   * 335     Recent Labs     05/15/22  0645 05/14/22  0520 05/13/22  0930   * 132*  --    K 4.8 4.9  --    CL 98 98  --    CO2 30 31  --    * 177*  --    BUN 21* 15  --    CREA 0.51* 0.56  --    CA 9.2 9.4  --    MG  --  2.3 1.9       Signed: Peace Delgado MD

## 2022-05-15 NOTE — PROGRESS NOTES
End of Shift Note    Bedside shift change report given to  Brigette Patient (oncoming nurse) by Gary Mo RN (offgoing nurse). Report included the following information SBAR, Intake/Output, MAR and Recent Results    Shift worked:  7a to 7p     Shift summary and any significant changes:     Family would like patient to work with PT tomorrow. Buttock area excoriated, zinc cream to open areas and Venelex to intact skin applied. Concerns for physician to address:  Mobility     Zone phone for oncoming shift:          Activity:  Activity Level: Bed Rest  Number times ambulated in hallways past shift: 0  Number of times OOB to chair past shift: 0    Cardiac:   Cardiac Monitoring: Yes      Cardiac Rhythm: 1\" AV Block    Access:   Current line(s): PIV     Genitourinary:   Urinary status: voiding, incontinent and external catheter    Respiratory:   O2 Device: Nasal cannula  Chronic home O2 use?: NO  Incentive spirometer at bedside: NO       GI:  Last Bowel Movement Date: 05/11/22  Current diet:  ADULT DIET Easy to Chew; Low Fat/Low Chol/High Fiber/ARMINDA  Passing flatus: YES  Tolerating current diet: YES       Pain Management:   Patient states pain is manageable on current regimen: YES    Skin:  Elliot Score: 17  Interventions: turn team, speciality bed, increase time out of bed and PT/OT consult    Patient Safety:  Fall Score:  Total Score: 4  Interventions: bed/chair alarm and gripper socks  High Fall Risk: Yes    Length of Stay:  Expected LOS: 2d 12h  Actual LOS: [facility]

## 2022-05-15 NOTE — PROGRESS NOTES
Message sent to Dr. Amy Ramesh re:Potassium 4.8 today, do you want her to get 40 mEq of potassium this morning?

## 2022-05-15 NOTE — PROGRESS NOTES
Problem: Falls - Risk of  Goal: *Absence of Falls  Description: Document Cheri Quezada Fall Risk and appropriate interventions in the flowsheet. Outcome: Progressing Towards Goal  Note: Fall Risk Interventions:  Mobility Interventions: Bed/chair exit alarm    Mentation Interventions: Adequate sleep, hydration, pain control,Bed/chair exit alarm,Gait belt with transfers/ambulation    Medication Interventions: Bed/chair exit alarm    Elimination Interventions: Bed/chair exit alarm,Call light in reach    History of Falls Interventions: Investigate reason for fall,Door open when patient unattended,Bed/chair exit alarm         Problem: Patient Education: Go to Patient Education Activity  Goal: Patient/Family Education  Outcome: Progressing Towards Goal     Problem: Pressure Injury - Risk of  Goal: *Prevention of pressure injury  Description: Document Elliot Scale and appropriate interventions in the flowsheet. Outcome: Progressing Towards Goal  Note: Pressure Injury Interventions:  Sensory Interventions: Assess changes in LOC    Moisture Interventions: Absorbent underpads,Apply protective barrier, creams and emollients    Activity Interventions: Increase time out of bed    Mobility Interventions: HOB 30 degrees or less    Nutrition Interventions: Document food/fluid/supplement intake    Friction and Shear Interventions: Lift sheet,Minimize layers                Problem: Pressure Injury - Risk of  Goal: *Prevention of pressure injury  Description: Document Elliot Scale and appropriate interventions in the flowsheet.   Outcome: Progressing Towards Goal  Note: Pressure Injury Interventions:  Sensory Interventions: Assess changes in LOC    Moisture Interventions: Absorbent underpads,Apply protective barrier, creams and emollients    Activity Interventions: Increase time out of bed    Mobility Interventions: HOB 30 degrees or less    Nutrition Interventions: Document food/fluid/supplement intake    Friction and Shear Interventions: Lift sheet,Minimize layers                Problem: Patient Education: Go to Patient Education Activity  Goal: Patient/Family Education  Outcome: Progressing Towards Goal     Problem: Patient Education: Go to Patient Education Activity  Goal: Patient/Family Education  Outcome: Progressing Towards Goal     Problem: General Medical Care Plan  Goal: *Vital signs within specified parameters  Outcome: Progressing Towards Goal  Goal: *Labs within defined limits  Outcome: Progressing Towards Goal  Goal: *Absence of infection signs and symptoms  Outcome: Progressing Towards Goal  Goal: *Optimal pain control at patient's stated goal  Outcome: Progressing Towards Goal  Goal: *Skin integrity maintained  Outcome: Progressing Towards Goal  Goal: *Fluid volume balance  Outcome: Progressing Towards Goal  Goal: *Optimize nutritional status  Outcome: Progressing Towards Goal  Goal: *Anxiety reduced or absent  Outcome: Progressing Towards Goal  Goal: *Progressive mobility and function (eg: ADL's)  Outcome: Progressing Towards Goal     Problem: Patient Education: Go to Patient Education Activity  Goal: Patient/Family Education  Outcome: Progressing Towards Goal     Problem: General Infection Care Plan (Adult and Pediatric)  Goal: Improvement in signs and symptoms of infection  Outcome: Progressing Towards Goal  Goal: *Optimize nutritional status  Outcome: Progressing Towards Goal

## 2022-05-16 LAB
ANION GAP SERPL CALC-SCNC: 4 MMOL/L (ref 5–15)
BUN SERPL-MCNC: 25 MG/DL (ref 6–20)
BUN/CREAT SERPL: 38 (ref 12–20)
CALCIUM SERPL-MCNC: 9.1 MG/DL (ref 8.5–10.1)
CHLORIDE SERPL-SCNC: 97 MMOL/L (ref 97–108)
CO2 SERPL-SCNC: 31 MMOL/L (ref 21–32)
CREAT SERPL-MCNC: 0.65 MG/DL (ref 0.55–1.02)
ERYTHROCYTE [DISTWIDTH] IN BLOOD BY AUTOMATED COUNT: 12.7 % (ref 11.5–14.5)
GLUCOSE SERPL-MCNC: 160 MG/DL (ref 65–100)
HCT VFR BLD AUTO: 34.8 % (ref 35–47)
HGB BLD-MCNC: 11.4 G/DL (ref 11.5–16)
MCH RBC QN AUTO: 31.4 PG (ref 26–34)
MCHC RBC AUTO-ENTMCNC: 32.8 G/DL (ref 30–36.5)
MCV RBC AUTO: 95.9 FL (ref 80–99)
NRBC # BLD: 0 K/UL (ref 0–0.01)
NRBC BLD-RTO: 0 PER 100 WBC
PLATELET # BLD AUTO: 427 K/UL (ref 150–400)
PMV BLD AUTO: 8.8 FL (ref 8.9–12.9)
POTASSIUM SERPL-SCNC: 4.6 MMOL/L (ref 3.5–5.1)
RBC # BLD AUTO: 3.63 M/UL (ref 3.8–5.2)
SODIUM SERPL-SCNC: 132 MMOL/L (ref 136–145)
WBC # BLD AUTO: 8.5 K/UL (ref 3.6–11)

## 2022-05-16 PROCEDURE — 74011000250 HC RX REV CODE- 250: Performed by: STUDENT IN AN ORGANIZED HEALTH CARE EDUCATION/TRAINING PROGRAM

## 2022-05-16 PROCEDURE — 74011250636 HC RX REV CODE- 250/636: Performed by: HOSPITALIST

## 2022-05-16 PROCEDURE — 85027 COMPLETE CBC AUTOMATED: CPT

## 2022-05-16 PROCEDURE — 74011250636 HC RX REV CODE- 250/636: Performed by: STUDENT IN AN ORGANIZED HEALTH CARE EDUCATION/TRAINING PROGRAM

## 2022-05-16 PROCEDURE — 97116 GAIT TRAINING THERAPY: CPT

## 2022-05-16 PROCEDURE — 94760 N-INVAS EAR/PLS OXIMETRY 1: CPT

## 2022-05-16 PROCEDURE — 36415 COLL VENOUS BLD VENIPUNCTURE: CPT

## 2022-05-16 PROCEDURE — 74011250637 HC RX REV CODE- 250/637: Performed by: INTERNAL MEDICINE

## 2022-05-16 PROCEDURE — 77010033678 HC OXYGEN DAILY

## 2022-05-16 PROCEDURE — 77030038269 HC DRN EXT URIN PURWCK BARD -A

## 2022-05-16 PROCEDURE — 65270000046 HC RM TELEMETRY

## 2022-05-16 PROCEDURE — 97530 THERAPEUTIC ACTIVITIES: CPT

## 2022-05-16 PROCEDURE — 74011250637 HC RX REV CODE- 250/637: Performed by: STUDENT IN AN ORGANIZED HEALTH CARE EDUCATION/TRAINING PROGRAM

## 2022-05-16 PROCEDURE — 80048 BASIC METABOLIC PNL TOTAL CA: CPT

## 2022-05-16 RX ORDER — ALBUTEROL SULFATE 90 UG/1
1 AEROSOL, METERED RESPIRATORY (INHALATION)
Status: DISCONTINUED | OUTPATIENT
Start: 2022-05-16 | End: 2022-05-16 | Stop reason: CLARIF

## 2022-05-16 RX ORDER — ALBUTEROL SULFATE 0.83 MG/ML
2.5 SOLUTION RESPIRATORY (INHALATION)
Status: DISCONTINUED | OUTPATIENT
Start: 2022-05-16 | End: 2022-05-17 | Stop reason: HOSPADM

## 2022-05-16 RX ADMIN — FUROSEMIDE 40 MG: 40 TABLET ORAL at 10:25

## 2022-05-16 RX ADMIN — PANTOPRAZOLE SODIUM 40 MG: 40 TABLET, DELAYED RELEASE ORAL at 10:25

## 2022-05-16 RX ADMIN — METHYLPREDNISOLONE SODIUM SUCCINATE 20 MG: 40 INJECTION, POWDER, FOR SOLUTION INTRAMUSCULAR; INTRAVENOUS at 10:25

## 2022-05-16 RX ADMIN — SODIUM CHLORIDE, PRESERVATIVE FREE 10 ML: 5 INJECTION INTRAVENOUS at 18:22

## 2022-05-16 RX ADMIN — LEVOTHYROXINE SODIUM 100 MCG: 0.1 TABLET ORAL at 06:27

## 2022-05-16 RX ADMIN — CASTOR OIL AND BALSAM, PERU: 788; 87 OINTMENT TOPICAL at 22:19

## 2022-05-16 RX ADMIN — SODIUM CHLORIDE, PRESERVATIVE FREE 10 ML: 5 INJECTION INTRAVENOUS at 06:30

## 2022-05-16 RX ADMIN — ENOXAPARIN SODIUM 40 MG: 100 INJECTION SUBCUTANEOUS at 10:25

## 2022-05-16 RX ADMIN — METHYLPREDNISOLONE SODIUM SUCCINATE 20 MG: 40 INJECTION, POWDER, FOR SOLUTION INTRAMUSCULAR; INTRAVENOUS at 22:08

## 2022-05-16 RX ADMIN — SODIUM CHLORIDE, PRESERVATIVE FREE 10 ML: 5 INJECTION INTRAVENOUS at 22:10

## 2022-05-16 RX ADMIN — POTASSIUM CHLORIDE 40 MEQ: 750 TABLET, EXTENDED RELEASE ORAL at 10:24

## 2022-05-16 RX ADMIN — CASTOR OIL AND BALSAM, PERU: 788; 87 OINTMENT TOPICAL at 13:00

## 2022-05-16 RX ADMIN — LATANOPROST 1 DROP: 50 SOLUTION OPHTHALMIC at 22:21

## 2022-05-16 NOTE — PROGRESS NOTES
Problem: Falls - Risk of  Goal: *Absence of Falls  Description: Document Helix Fall Risk and appropriate interventions in the flowsheet. Outcome: Progressing Towards Goal  Note: Fall Risk Interventions:  Mobility Interventions: Bed/chair exit alarm    Mentation Interventions: Adequate sleep, hydration, pain control,Bed/chair exit alarm    Medication Interventions: Bed/chair exit alarm    Elimination Interventions: Bed/chair exit alarm,Call light in reach    History of Falls Interventions: Investigate reason for fall         Problem: Patient Education: Go to Patient Education Activity  Goal: Patient/Family Education  Outcome: Progressing Towards Goal     Problem: Pressure Injury - Risk of  Goal: *Prevention of pressure injury  Description: Document Elliot Scale and appropriate interventions in the flowsheet.   Outcome: Progressing Towards Goal  Note: Pressure Injury Interventions:  Sensory Interventions: Assess changes in LOC    Moisture Interventions: Absorbent underpads,Apply protective barrier, creams and emollients    Activity Interventions: PT/OT evaluation    Mobility Interventions: HOB 30 degrees or less,PT/OT evaluation    Nutrition Interventions: Document food/fluid/supplement intake    Friction and Shear Interventions: Apply protective barrier, creams and emollients,HOB 30 degrees or less,Foam dressings/transparent film/skin sealants,Lift sheet,Lift team/patient mobility team                Problem: Patient Education: Go to Patient Education Activity  Goal: Patient/Family Education  Outcome: Progressing Towards Goal     Problem: Patient Education: Go to Patient Education Activity  Goal: Patient/Family Education  Outcome: Progressing Towards Goal     Problem: General Medical Care Plan  Goal: *Vital signs within specified parameters  Outcome: Progressing Towards Goal  Goal: *Labs within defined limits  Outcome: Progressing Towards Goal  Goal: *Absence of infection signs and symptoms  Outcome: Progressing Towards Goal  Goal: *Optimal pain control at patient's stated goal  Outcome: Progressing Towards Goal  Goal: *Skin integrity maintained  Outcome: Progressing Towards Goal  Goal: *Fluid volume balance  Outcome: Progressing Towards Goal  Goal: *Optimize nutritional status  Outcome: Progressing Towards Goal  Goal: *Anxiety reduced or absent  Outcome: Progressing Towards Goal  Goal: *Progressive mobility and function (eg: ADL's)  Outcome: Progressing Towards Goal     Problem: General Medical Care Plan  Goal: *Vital signs within specified parameters  Outcome: Progressing Towards Goal  Goal: *Labs within defined limits  Outcome: Progressing Towards Goal  Goal: *Absence of infection signs and symptoms  Outcome: Progressing Towards Goal  Goal: *Optimal pain control at patient's stated goal  Outcome: Progressing Towards Goal  Goal: *Skin integrity maintained  Outcome: Progressing Towards Goal  Goal: *Fluid volume balance  Outcome: Progressing Towards Goal  Goal: *Optimize nutritional status  Outcome: Progressing Towards Goal  Goal: *Anxiety reduced or absent  Outcome: Progressing Towards Goal  Goal: *Progressive mobility and function (eg: ADL's)  Outcome: Progressing Towards Goal     Problem: Patient Education: Go to Patient Education Activity  Goal: Patient/Family Education  Outcome: Progressing Towards Goal     Problem: General Infection Care Plan (Adult and Pediatric)  Goal: Improvement in signs and symptoms of infection  Outcome: Progressing Towards Goal  Goal: *Optimize nutritional status  Outcome: Progressing Towards Goal     Problem: Patient Education: Go to Patient Education Activity  Goal: Patient/Family Education  Outcome: Progressing Towards Goal     Problem: General Infection Care Plan (Adult and Pediatric)  Goal: Improvement in signs and symptoms of infection  Outcome: Progressing Towards Goal  Goal: *Optimize nutritional status  Outcome: Progressing Towards Goal     Problem: Patient Education: Go to Patient Education Activity  Goal: Patient/Family Education  Outcome: Progressing Towards Goal

## 2022-05-16 NOTE — PROGRESS NOTES
End of Shift Note     Bedside shift change report given to Radha Zhong (oncoming nurse) by Wendy Miranda RN (offgoing nurse). Report included the following information SBAR, Intake/Output, MAR and Recent Results     Shift worked:  days   Shift summary and any significant changes:     Patient weaned off O2, only 1-2L needed when ambulating.        Concerns for physician to address:  none      Zone phone for oncoming shift:     [phone]      Activity:  Activity Level: Bed Rest  Number times ambulated in hallways past shift: 0  Number of times OOB to chair past shift: 0     Cardiac:   Cardiac Monitoring: Yes      Cardiac Rhythm: 1\" AV Block     Access:   Current line(s): PIV      Genitourinary:   Urinary status: voiding, incontinent and external catheter     Respiratory:   O2 Device: Nasal cannula, only 1-2L necessary when transferring/walking. RA at rest.  Chronic home O2 use?: NO  Incentive spirometer at bedside: NO     GI:  Last Bowel Movement Date: 05/11/22  Current diet:  ADULT DIET Easy to Chew; Low Fat/Low Chol/High Fiber/ARMINDA  Passing flatus: YES  Tolerating current diet: YES     Pain Management:   Patient states pain is manageable on current regimen: YES     Skin:  Elliot Score: 17  Interventions: turn team, speciality bed, increase time out of bed and PT/OT consult    Patient Safety:  Fall Score: Total Score: 4  Interventions: bed/chair alarm and gripper socks  High Fall Risk:  Yes     Length of Stay:  Expected LOS: 2d 12h  Actual LOS: 7       Wendy Miranda RN

## 2022-05-16 NOTE — PROGRESS NOTES
End of Shift Note    Bedside shift change report given to OTTO Naik (oncoming nurse) by Yared Jones RN (offgoing nurse). Report included the following information SBAR, Intake/Output, MAR and Recent Results    Shift worked:  nights   Shift summary and any significant changes:    No changes     Concerns for physician to address:  none     Zone phone for oncoming shift:     [phone]     Activity:  Activity Level: Bed Rest  Number times ambulated in hallways past shift: 0  Number of times OOB to chair past shift: 0    Cardiac:   Cardiac Monitoring: Yes      Cardiac Rhythm: 1\" AV Block    Access:   Current line(s): PIV     Genitourinary:   Urinary status: voiding, incontinent and external catheter    Respiratory:   O2 Device: Nasal cannula  Chronic home O2 use?: NO  Incentive spirometer at bedside: NO       GI:  Last Bowel Movement Date: 05/11/22  Current diet:  ADULT DIET Easy to Chew; Low Fat/Low Chol/High Fiber/ARMINDA  Passing flatus: YES  Tolerating current diet: YES       Pain Management:   Patient states pain is manageable on current regimen: YES    Skin:  Elliot Score: 17  Interventions: turn team, speciality bed, increase time out of bed and PT/OT consult    Patient Safety:  Fall Score:  Total Score: 4  Interventions: bed/chair alarm and gripper socks  High Fall Risk: Yes    Length of Stay:  Expected LOS: 2d 12h  Actual LOS: Nabeel Granado, RN

## 2022-05-16 NOTE — PROGRESS NOTES
Transition of Care Plan:     RUR: 13% - \"low risk\"  Disposition: SNF - [address] [place] [address],[address]   Follow up appointments: PCP & specialist as indicated  DME needed: Defer to SNF for DME needs  Transportation at Discharge: [facility] on will call for d/c 5/17/22; PCS completed, copy on chart  Keys or means to access home: N/A - pt transitioning to SNF at d/c       IM Medicare Letter: 2nd IM needed prior to d/c  Is patient a BCPI-A Bundle: N/A           Is patient a  and connected with the VA? N/A              Caregiver Contact: Pt's son Saloni Bella - cell: [phone], home: [phone])  Discharge Caregiver contacted prior to discharge? To be contacted prior to d/c  Care Conference needed?: N/A        Initial note: Chart reviewed, IDR completed. MD reported SON for 5/17/22. CM contacted SNF - [address] [place] [address],[address] ([phone]) to discuss pt's transition to the facility 5/17/22. CM spoke with admission liaison Abdulaziz Johnson who confirmed the facility can accept pt tomorrow. CM to contact [address] [place] [address],[address] admission liaison tomorrow morning to discuss time-frame of d/c to facility. Pt on will call with [facility]; PCS to be completed & placed on chart. CM will continue to follow & remain accessible for d/c planning. Care Management Interventions  PCP Verified by CM:  Yes  Last Visit to PCP: 05/09/22  Palliative Care Criteria Met (RRAT>21 & CHF Dx)?: No  Mode of Transport at Discharge: [facility] Transport Time of Discharge: 1400  Transition of Care Consult (CM Consult): SNF  Partner SNF: Yes  Discharge Durable Medical Equipment: No  Physical Therapy Consult: Yes  Occupational Therapy Consult: Yes  Speech Therapy Consult: No  Support Systems: Other Family Member(s)  Confirm Follow Up Transport: Family  The Plan for Transition of Care is Related to the Following Treatment Goals : SNF  The Patient and/or Patient Representative was Provided with a Choice of Provider and Agrees with the Discharge Plan?: Yes  Name of the Patient Representative Who was Provided with a Choice of Provider and Agrees with the Discharge Plan: brother/primary mPOA Bebeto Low)  [place] of Choice List was Provided with Basic Dialogue that Supports the Patient's Individualized Plan of Care/Goals, Treatment Preferences and Shares the Quality Data Associated with the Providers?: Yes   Resource Information Provided?: No  Discharge Location  Patient Expects to be Discharged to[de-identified] Skilled nursing facility [address] [place] [address],[address])    Aramis oPol, [facility], [facility]  [phone]

## 2022-05-16 NOTE — PROGRESS NOTES
Hospitalist Progress Note    NAME: Trevor Medley   :  1928   MRN:  [id number]       Assessment / Plan:  Asthmatic bronchitis / CAP? Recent fall  Deconditioning  Right hip pain and right shoulder pain and weakness from recent fall. CT pelvis negative for fracture  x-ray right shoulder No acute abnormality. Osteopenia and degenerative changes  CT head was ordered, but do not feel it is necessary at this time as patient is mentating and she would need a sedative to undergo this test which is not ideal in this 81 yo  Doppler negative for DVT  CTA chest  1. No evidence pulmonary embolism. 2. Small right pleural effusion. Bibasilar patchy airspace consolidation. 3. Several nonspecific right lung subcentimeter nodular densities. Consider  short interval follow-up. 4. Moderate-sized hiatal hernia.     Abx was stopped as PCT negative. COVID-19 negative  Echo EF 55-60%, mild AS, trace MR, RV normal  Today on 4 liters oxygen, titrate as tolerated. c/w PTA oral lasix 40  Tapering steroids   -acapella flutter valve with xopenex nebs  -discussed with Dr Sigrid Rios, given Echo, lack of response to IV bumex and sounding more wheezy today, this is likely asthmatic bronchitis probably triggered by initial CAP  -wean oxygen  -PT OT consulted - recommending SNF     CAD  GERD  Hypothyroidism  Resume home meds     Hard of hearing     Code Status: DNR, confirmed by daughter at the bedside  Surrogate Decision Maker: Her son and daughter     DVT Prophylaxis: Lovenox  GI Prophylaxis: not indicated     Baseline: Was able to ambulate with a walker prior to last week.        18.5 - 24.9 Normal weight / Body mass index is 25.83 kg/m².     Recommended Disposition: SNF/LTC    Estimated discharge date:   Barriers:     Subjective:     Chief Complaint / Reason for Physician Visit  She is on 4 liters oxygen, feels better.     Review of Systems:  Symptom Y/N Comments  Symptom Y/N Comments   Fever/Chills    Chest Pain     Poor Appetite    Edema     Cough    Abdominal Pain     Sputum    Joint Pain     SOB/GUTHRIE    Pruritis/Rash     Nausea/vomit    Tolerating PT/OT     Diarrhea    Tolerating Diet     Constipation    Other       Could NOT obtain due to:      Objective:     VITALS:   Last 24hrs VS reviewed since prior progress note. Most recent are:  Patient Vitals for the past 24 hrs:   Temp Pulse Resp BP SpO2   05/16/22 1250 97.6 °F (36.4 °C) 73 20 (!) 125/57 91 %   05/16/22 0800 99 °F (37.2 °C) (!) 58 16 133/84 99 %   05/16/22 0314 98.9 °F (37.2 °C) 69 18 128/77 96 %   05/15/22 2310 97.9 °F (36.6 °C) 68 16 (!) 144/77 99 %   05/15/22 1930 97.2 °F (36.2 °C) 65 18 (!) 161/72 95 %   05/15/22 1609 97.1 °F (36.2 °C) 68 18 138/63 95 %       Intake/Output Summary (Last 24 hours) at 5/16/2022 1606  Last data filed at 5/16/2022 0400  Gross per 24 hour   Intake --   Output 1650 ml   Net -1650 ml        I had a face to face encounter and independently examined this patient on 5/16/2022, as outlined below:  PHYSICAL EXAM:  General: Awake, No acute distress  EENT:  EOMI. Anicteric sclerae. MMM  Resp:  CTA bilaterally, no wheezing or rales. No accessory muscle use  CV:  Regular  rhythm,  No edema  GI:  Soft, Non distended, Non tender. +Bowel sounds  Neurologic:  Alert and oriented X 3, normal speech,   Psych:   Not anxious nor agitated  Skin:  No rashes. No jaundice    Reviewed most current lab test results and cultures  YES  Reviewed most current radiology test results   YES  Review and summation of old records today    NO  Reviewed patient's current orders and MAR    YES  PMH/SH reviewed - no change compared to H&P  ________________________________________________________________________  Care Plan discussed with:    Comments   Patient y    Family  y Daughter at bedside   RN y    Care Manager     Consultant                        Multidiciplinary team rounds were held today with , nursing, pharmacist and clinical coordinator.   Patient's plan of care was discussed; medications were reviewed and discharge planning was addressed. ________________________________________________________________________  Total NON critical care TIME: 37  Minutes    Total CRITICAL CARE TIME Spent:   Minutes non procedure based      Comments   >50% of visit spent in counseling and coordination of care     ________________________________________________________________________  Neida John MD     Procedures: see electronic medical records for all procedures/Xrays and details which were not copied into this note but were reviewed prior to creation of Plan. LABS:  I reviewed today's most current labs and imaging studies.   Pertinent labs include:  Recent Labs     05/16/22  0003 05/15/22  0645   WBC 8.5 9.4   HGB 11.4* 10.9*   HCT 34.8* 33.7*   * 414*     Recent Labs     05/16/22  0003 05/15/22  0645 05/14/22  0520   * 131* 132*   K 4.6 4.8 4.9   CL 97 98 98   CO2 31 30 31   * 160* 177*   BUN 25* 21* 15   CREA 0.65 0.51* 0.56   CA 9.1 9.2 9.4   MG  --   --  2.3       Signed: Neida John MD

## 2022-05-16 NOTE — PROGRESS NOTES
Problem: Mobility Impaired (Adult and Pediatric)  Goal: *Acute Goals and Plan of Care (Insert Text)  Description: FUNCTIONAL STATUS PRIOR TO ADMISSION: Patient reports living alone with 4 local children and a neighbor available to assist. Until recent falls, she reports being able to manage basic self-care tasks. RN indicates that she has been mostly in the bed over the past week (at home). HOME SUPPORT PRIOR TO ADMISSION: Neighbor and 4 local children    Physical Therapy Goals  Initiated 5/10/2022  1. Patient will move from supine to sit and sit to supine  and roll side to side in bed with minimal assistance/contact guard assist within 7 day(s). 2.  Patient will transfer from bed to chair and chair to bed with maximal assistance using the least restrictive device within 7 day(s). 3.  Patient will perform sit to stand with maximal assistance within 7 day(s). Outcome: Progressing Towards Goal   PHYSICAL THERAPY TREATMENT  Patient: Jose Reddy (19 y.o. female)  Date: 5/16/2022  Diagnosis: CAP (community acquired pneumonia) [J18.9] <principal problem not specified>       Precautions: Fall, check O2  Chart, physical therapy assessment, plan of care and goals were reviewed. ASSESSMENT  Patient continues with skilled PT services and is progressing towards goals. Pt received in on room air, in supine, agreeable to PT session, daughter at bedside. Noted improved bed mobility today requiring min A for rolling and mod A for trunk support supine > sit edge of bed. Multiple sit <>stands performed Mod-max A x2 with RW, very forward flexed posture but able to improve trunk support with cueing. 2L O2 via NC added with mobility to maintain sats > 90%. Stand-step transfer bed > chair with Mod A x2 and RW, for seated rest. 2nd gait bout performed after rest, 10ft total - 5ft forward, attempted backward but pt with high fear of falling, turned for 5ft return to chair.  Stand-pivot transfer bed > bedside commode for toileting attempt at end of session. Pt left seated on bedside commode, daughter in arms reach, attempting toileting. RN aware. She continues to require SNF rehab stay prior to return home. Will continue to follow. Current Level of Function Impacting Discharge (mobility/balance): Mod-Max A x2 with RW    Other factors to consider for discharge: lives with children who were caring for her at home. Per daughter, unable to provide level of assist needed at this time. PLAN :  Patient continues to benefit from skilled intervention to address the above impairments. Continue treatment per established plan of care. to address goals. Recommendation for discharge: (in order for the patient to meet his/her long term goals)  Therapy up to 5 days/week in SNF setting    This discharge recommendation:  Has been made in collaboration with the attending provider and/or case management    IF patient discharges home will need the following DME: rolling walker       SUBJECTIVE:   Patient stated I want to try, I haven't had a BM since I've been here.     OBJECTIVE DATA SUMMARY:   Critical Behavior:  Neurologic State: Alert  Orientation Level: Oriented to person  Cognition: Follows commands     Functional Mobility Training:  Bed Mobility:  Rolling: Minimum assistance  Supine to Sit: Moderate assistance     Scooting: Moderate assistance        Transfers:  Sit to Stand: Moderate assistance;Maximum assistance;Assist x2  Stand to Sit: Moderate assistance;Assist x2                             Balance:  Sitting: Impaired; With support  Sitting - Static: Fair (occasional); Supported sitting  Sitting - Dynamic: Fair (occasional); Supported sitting  Standing: Impaired; With support  Standing - Static: Constant support; Fair  Standing - Dynamic : Constant support;Poor  Ambulation/Gait Training:  Distance (ft): 10 Feet (ft) (5ft forward. unable to backwards walk.  turned for 2nd 5ft)  Assistive Device: Gait belt;Walker, rolling  Ambulation - Level of Assistance: Minimal assistance; Moderate assistance;Assist x2        Gait Abnormalities: Decreased step clearance;Shuffling gait        Base of Support: Widened     Speed/Bianca: Pace decreased (<100 feet/min); Shuffled; Slow  Step Length: Left shortened;Right shortened       Pain Rating:  No pain reported. Activity Tolerance:   Fair and requires frequent rest breaks    After treatment patient left in no apparent distress:   Seated on bedside commode attempting toileting. Daughter within arms reach and call bell on lap. RN aware. COMMUNICATION/COLLABORATION:   The patients plan of care was discussed with: Registered nurse.      Jonelle Garcia, PT, DPT, NCS   Time Calculation: 34 mins

## 2022-05-17 ENCOUNTER — APPOINTMENT (OUTPATIENT)
Dept: GENERAL RADIOLOGY | Age: 87
DRG: 203 | End: 2022-05-17
Attending: HOSPITALIST
Payer: MEDICARE

## 2022-05-17 VITALS
SYSTOLIC BLOOD PRESSURE: 139 MMHG | TEMPERATURE: 98.1 F | RESPIRATION RATE: 18 BRPM | BODY MASS INDEX: 25.33 KG/M2 | WEIGHT: 157.63 LBS | OXYGEN SATURATION: 92 % | HEART RATE: 64 BPM | DIASTOLIC BLOOD PRESSURE: 55 MMHG | HEIGHT: 66 IN

## 2022-05-17 LAB
COVID-19 RAPID TEST, COVR: NOT DETECTED
SOURCE, COVRS: NORMAL

## 2022-05-17 PROCEDURE — 74011250636 HC RX REV CODE- 250/636: Performed by: HOSPITALIST

## 2022-05-17 PROCEDURE — 77010033678 HC OXYGEN DAILY

## 2022-05-17 PROCEDURE — 74011250636 HC RX REV CODE- 250/636: Performed by: STUDENT IN AN ORGANIZED HEALTH CARE EDUCATION/TRAINING PROGRAM

## 2022-05-17 PROCEDURE — 94760 N-INVAS EAR/PLS OXIMETRY 1: CPT

## 2022-05-17 PROCEDURE — 71046 X-RAY EXAM CHEST 2 VIEWS: CPT

## 2022-05-17 PROCEDURE — 74011000250 HC RX REV CODE- 250: Performed by: STUDENT IN AN ORGANIZED HEALTH CARE EDUCATION/TRAINING PROGRAM

## 2022-05-17 PROCEDURE — 74011250637 HC RX REV CODE- 250/637: Performed by: INTERNAL MEDICINE

## 2022-05-17 PROCEDURE — 87635 SARS-COV-2 COVID-19 AMP PRB: CPT

## 2022-05-17 PROCEDURE — 74011250637 HC RX REV CODE- 250/637: Performed by: STUDENT IN AN ORGANIZED HEALTH CARE EDUCATION/TRAINING PROGRAM

## 2022-05-17 RX ORDER — PREDNISONE 20 MG/1
20 TABLET ORAL
Qty: 3 TABLET | Refills: 0 | Status: SHIPPED | OUTPATIENT
Start: 2022-05-17 | End: 2022-05-20

## 2022-05-17 RX ORDER — ALBUTEROL SULFATE 90 UG/1
1 AEROSOL, METERED RESPIRATORY (INHALATION)
Qty: 18 G | Refills: 1 | Status: SHIPPED
Start: 2022-05-17

## 2022-05-17 RX ADMIN — LEVOTHYROXINE SODIUM 100 MCG: 0.1 TABLET ORAL at 06:28

## 2022-05-17 RX ADMIN — POTASSIUM CHLORIDE 40 MEQ: 750 TABLET, EXTENDED RELEASE ORAL at 09:32

## 2022-05-17 RX ADMIN — PANTOPRAZOLE SODIUM 40 MG: 40 TABLET, DELAYED RELEASE ORAL at 09:32

## 2022-05-17 RX ADMIN — FUROSEMIDE 40 MG: 40 TABLET ORAL at 09:32

## 2022-05-17 RX ADMIN — ENOXAPARIN SODIUM 40 MG: 100 INJECTION SUBCUTANEOUS at 09:32

## 2022-05-17 RX ADMIN — SODIUM CHLORIDE, PRESERVATIVE FREE 10 ML: 5 INJECTION INTRAVENOUS at 06:29

## 2022-05-17 RX ADMIN — METHYLPREDNISOLONE SODIUM SUCCINATE 20 MG: 40 INJECTION, POWDER, FOR SOLUTION INTRAMUSCULAR; INTRAVENOUS at 09:32

## 2022-05-17 NOTE — PROGRESS NOTES
Occupational Therapy    Attempted to see pt for OT session however pt currently MARK for xray. Will defer and continue to follow. Per CM pt to DC to [facility] later today.      Heena Jaimes, KENNY, OTR/L

## 2022-05-17 NOTE — PROGRESS NOTES
No further CM needs identified. CM notified pt's nurse of d/c. Transition of Care Plan:     RUR: 13% - \"low risk\"  Disposition: SNF - [address] [facility]   *Report: [phone]   *Room: 106 A   *Fax: [phone]  Follow up appointments: PCP & specialist as indicated  DME needed: Defer to SNF for DME needs  Transportation at Discharge: [facility] secured for 2:00 PM; PCS completed, copy on chart  Keys or means to access home: N/A - pt transitioning to SNF at d/c       IM Medicare Letter: 2nd IM reviewed/signed at bedside 5/17/22; copy on chart  Is patient a BCPI-A Bundle: N/A           Is patient a  and connected with the VA? N/A              Caregiver Contact: Pt's son Annita Roberts - cell: [phone], home: [phone])  Discharge Caregiver contacted prior to discharge? Daughter present at bedside the day of d/c (5/17/22); daughter agreeable to the d/c plan  Care Conference needed?: N/A      COVID-19 test: Rapid COVID-19 test completed 5/17/22; results negative    Update - 11:00 AM: CM met with pt and daughter Tia Stuart at bedside, reviewed plan for d/c, and confirmed both parties are agreeable. Pt/daughter aware of [facility] transport secured for 2:00 PM. CM provided pt/daughter with an index card detailing [facility]'s address & phone number for reference. No additional questions/concerns identified. 2nd IM reviewed/signed; copy on chart. CM will remain accessible for consult if additional needs arise prior to d/c.     Update - 8:47 AM: MD confirmed pt is medically stable for d/c to SNF today; MD to place order for rapid COVID-19 test.     Initial note: Chart reviewed. CM contacted [address] [facility] ([phone]) to discuss pt's transition to the facility today. CM spoke with admission liaison Radha Valdes) who will confirm bed availability for admission today after facility's morning meeting. CM took pt off will call with [facility] & requested pick-up time of 2:00 PM; request confirmed. PCS completed, copy to be placed on chart. [employer] message sent to MD requesting confirmation pt is medically stable for d/c to SNF. CM also requested MD to place order for rapid COVID-19 test per SNF protocol. CM will coordinate updates with pt & family at bedside. Care Management Interventions  PCP Verified by CM: Yes  Last Visit to PCP: 05/09/22  Palliative Care Criteria Met (RRAT>21 & CHF Dx)?: No  Mode of Transport at Discharge: [facility] Transport Time of Discharge: 1400  Transition of Care Consult (CM Consult): SNF  Partner SNF: Yes  Discharge Durable Medical Equipment: No  Physical Therapy Consult: Yes  Occupational Therapy Consult: Yes  Speech Therapy Consult: No  Support Systems: Other Family Member(s)  Confirm Follow Up Transport: Family  The Plan for Transition of Care is Related to the Following Treatment Goals : SNF  The Patient and/or Patient Representative was Provided with a Choice of Provider and Agrees with the Discharge Plan?: Yes  Name of the Patient Representative Who was Provided with a Choice of Provider and Agrees with the Discharge Plan: brother/primary mPOA Candace Favorite)  [place] of Choice List was Provided with Basic Dialogue that Supports the Patient's Individualized Plan of Care/Goals, Treatment Preferences and Shares the Quality Data Associated with the Providers?: Yes  [place] Resource Information Provided?: No  Discharge Location  Patient Expects to be Discharged to[de-identified] Skilled nursing facility [facility])    Transition of Care Plan to SNF/Rehab    SNF/Rehab Transition:  Patient has been accepted to [facility] and meets criteria for admission. Patient will transported by [facility] and expected to leave at 2:00 PM 5/17/22    Communication to Patient/Family:  Met with patient and daughter Pooja Agrawal) and they are agreeable to the transition plan.     Communication to SNF/Rehab:  Bedside RN has been notified to update the transition plan to the facility and call report: [phone]  Discharge information has been updated on the AVS.     Discharge instructions to be fax'd to facility at: [phone]    Nursing Please include all hard scripts for controlled substances, med rec and dc summary, and AVS in packet. Reviewed and confirmed with [facility] that they can manage the patient care needs for the following:     Noris Reyna with (X) only those applicable:    Medication:  [x]  Medications will be available at the facility  []  IV Antibiotics   []  Controlled Substance - hard copy to be sent with patient   []  Weekly Labs   Documents:  [] Hard RX  [x] MAR  [x] Kardex  [x] AVS  []Transfer Summary  [x]Discharge   Equipment:  []  CPAP/BiPAP  []  Wound Vacuum  []  [place] or Urinary Device  []  PICC/Central Line  []  Nebulizer  []  Ventilator   Treatment:  []Isolation (for MRSA, VRE, etc.)  []Surgical Drain Management  []Tracheostomy Care  []Dressing Changes  []Dialysis with transportation and chair time  []PEG Care  [x]Oxygen: 2-3L O2 (as needed). Pt is not on home O2 at baseline  []Daily Weights for Heart Failure   Dietary:  [x]Any diet limitations:   Primary Diet: Easy to Chew   Cardiac Restriction: Low Fat/Low Chol/High Fiber/ARMINDA     []Tube Feedings   []Total Parenteral Management (TPN)   Eligible for Medicaid Long Term Services and Supports  Yes:  [] Eligible for medical assistance or will become eligible within 180 days and UAI completed. [] Provider/Patient and/or support system has requested screening. [] UAI copy provided to patient or responsible party  [] UAI unavailable at discharge will send once processed to SNF provider. [] UAI unavailable at discharged mailed to patient  No:   [x] Private pay and is not financially eligible for Medicaid within the next 180 days: over-income for Medicaid coverage   [] Reside out-of-state.   [] A residents of a state owned/operated facility that is licensed  by [facility] or [facility]  [] Enrollment in [place] hospice services  [] Non [place] citizen  [] Patient /Family declines to have screening completed or provide financial information for screening     Financial Resources:  Medicaid    [] Initiated and application pending   [] Full coverage     Advanced Care Plan:  [x]Surrogate Decision Maker of Care: Pt's son Beth Cape - cell: [phone], home: [phone])  []POA  [x]Communicated Code Status: DNR   Other       Maria Luz Garcia, [address] [facility], [address] [facility]  [phone]

## 2022-05-17 NOTE — PROGRESS NOTES
Problem: Falls - Risk of  Goal: *Absence of Falls  Description: Document Miracle Wilburn Fall Risk and appropriate interventions in the flowsheet. Outcome: Progressing Towards Goal  Note: Fall Risk Interventions:  Mobility Interventions: Bed/chair exit alarm    Mentation Interventions: Bed/chair exit alarm    Medication Interventions: Bed/chair exit alarm    Elimination Interventions: Bed/chair exit alarm,Call light in reach    History of Falls Interventions: Bed/chair exit alarm         Problem: Patient Education: Go to Patient Education Activity  Goal: Patient/Family Education  Outcome: Progressing Towards Goal     Problem: Pressure Injury - Risk of  Goal: *Prevention of pressure injury  Description: Document Elliot Scale and appropriate interventions in the flowsheet.   Outcome: Progressing Towards Goal  Note: Pressure Injury Interventions:  Sensory Interventions: Assess changes in LOC    Moisture Interventions: Absorbent underpads,Apply protective barrier, creams and emollients    Activity Interventions: Increase time out of bed    Mobility Interventions: Float heels,HOB 30 degrees or less,PT/OT evaluation    Nutrition Interventions: Document food/fluid/supplement intake    Friction and Shear Interventions: Apply protective barrier, creams and emollients,Minimize layers,Lift team/patient mobility team                Problem: Patient Education: Go to Patient Education Activity  Goal: Patient/Family Education  Outcome: Progressing Towards Goal     Problem: Patient Education: Go to Patient Education Activity  Goal: Patient/Family Education  Outcome: Progressing Towards Goal     Problem: General Medical Care Plan  Goal: *Vital signs within specified parameters  Outcome: Progressing Towards Goal  Goal: *Labs within defined limits  Outcome: Progressing Towards Goal  Goal: *Absence of infection signs and symptoms  Outcome: Progressing Towards Goal  Goal: *Optimal pain control at patient's stated goal  Outcome: Progressing Towards Goal  Goal: *Skin integrity maintained  Outcome: Progressing Towards Goal  Goal: *Fluid volume balance  Outcome: Progressing Towards Goal  Goal: *Optimize nutritional status  Outcome: Progressing Towards Goal  Goal: *Anxiety reduced or absent  Outcome: Progressing Towards Goal  Goal: *Progressive mobility and function (eg: ADL's)  Outcome: Progressing Towards Goal     Problem: General Medical Care Plan  Goal: *Vital signs within specified parameters  Outcome: Progressing Towards Goal  Goal: *Labs within defined limits  Outcome: Progressing Towards Goal  Goal: *Absence of infection signs and symptoms  Outcome: Progressing Towards Goal  Goal: *Optimal pain control at patient's stated goal  Outcome: Progressing Towards Goal  Goal: *Skin integrity maintained  Outcome: Progressing Towards Goal  Goal: *Fluid volume balance  Outcome: Progressing Towards Goal  Goal: *Optimize nutritional status  Outcome: Progressing Towards Goal  Goal: *Anxiety reduced or absent  Outcome: Progressing Towards Goal  Goal: *Progressive mobility and function (eg: ADL's)  Outcome: Progressing Towards Goal     Problem: Patient Education: Go to Patient Education Activity  Goal: Patient/Family Education  Outcome: Progressing Towards Goal     Problem: General Infection Care Plan (Adult and Pediatric)  Goal: Improvement in signs and symptoms of infection  Outcome: Progressing Towards Goal  Goal: *Optimize nutritional status  Outcome: Progressing Towards Goal     Problem: Patient Education: Go to Patient Education Activity  Goal: Patient/Family Education  Outcome: Progressing Towards Goal     Problem: Patient Education: Go to Patient Education Activity  Goal: Patient/Family Education  Outcome: Progressing Towards Goal

## 2022-05-17 NOTE — PROGRESS NOTES
End of Shift Note    Bedside shift change report given to OTTO Naik (oncoming nurse) by Hanna Roland RN (offgoing nurse). Report included the following information SBAR, Intake/Output, MAR and Recent Results    Shift worked:  nights   Shift summary and any significant changes:    PT O2 desat to 88% while resting. oxygen replaced and level went up to 91%   `  none     Zone phone for oncoming shift:     [phone]     Activity:  Activity Level: Bed Rest  Number times ambulated in hallways past shift: 0  Number of times OOB to chair past shift: 0    Cardiac:   Cardiac Monitoring: Yes      Cardiac Rhythm: 1\" AV Block    Access:   Current line(s): PIV     Genitourinary:   Urinary status: voiding, incontinent and external catheter    Respiratory:   O2 Device: Nasal cannula  Chronic home O2 use?: NO  Incentive spirometer at bedside: NO       GI:  Last Bowel Movement Date: 05/11/22  Current diet:  ADULT DIET Easy to Chew; Low Fat/Low Chol/High Fiber/ARMINDA  Passing flatus: YES  Tolerating current diet: YES       Pain Management:   Patient states pain is manageable on current regimen: YES    Skin:  Elliot Score: 17  Interventions: turn team, speciality bed, increase time out of bed and PT/OT consult    Patient Safety:  Fall Score:  Total Score: 4  Interventions: bed/chair alarm and gripper socks  High Fall Risk: Yes    Length of Stay:  Expected LOS: 2d 12h  Actual LOS: [address] [place] [address] OTTO Dunham

## 2022-05-17 NOTE — DISCHARGE INSTRUCTIONS
HOSPITALIST DISCHARGE INSTRUCTIONS    NAME: Hal Carolina   :  1928   MRN:  [id number]     Date/Time:  2022 11:14 AM    ADMIT DATE: 2022   DISCHARGE DATE: 2022     Attending Physician: Simin Lucia MD    DISCHARGE DIAGNOSIS:  CAP  Asthma exacerbation      Medications: Per above medication reconciliation. Pain Management: per above medications    Recommended diet: Cardiac Diet    Recommended activity: Activity as tolerated    Wound care: None    Indwelling devices:  None    Supplemental Oxygen: 3 LNC,  wean as tolerated    Required Lab work: Per SNF routine    Glucose management:  None    Code status: DNR        Outside physician follow up: Follow-up Information     Follow up With Specialties Details Why Contact Info    Evi Sawyer, [address]  [address] [place] [address]   [phone]      [facility] [address]   [address] [address] [phone]               Skilled nursing facility/ SNF MD responsible for above on discharge. Information obtained by :  I understand that if any problems occur once I am at home I am to contact my physician. I understand and acknowledge receipt of the instructions indicated above.                                                                                                                                            Physician's or R.N.'s Signature                                                                  Date/Time                                                                                                                                              Patient or Repres

## 2022-05-17 NOTE — DISCHARGE SUMMARY
Hospitalist Discharge Summary     Patient ID:  Miguel Santo  [id number]  89 y.o.  7/18/1928 5/9/2022    PCP on record: Eliud Lr MD    Admit date: 5/9/2022  Discharge date and time: 5/17/2022    DISCHARGE DIAGNOSIS:  Asthma exacerbation  CAP    CONSULTATIONS:  None    Excerpted HPI from H&P of Michel Alcala MD:  Sheri Tompkins is a 80 y.o. female with past medical history of CAD, breast cancer, GERD, hypothyroidism presented with cough and shortness of breath. She started having cough with phlegm 1 week back, was treated with Zithromax as outpatient by her PMD.  However this did not relieve her symptoms and she continues to have generalized weakness. She had a fall 4 days back, when she was trying to get back to her bed she slipped and fell. She was seen in ED at that time and her imaging studies were negative. However she continues to have right hip and right shoulder pain and she has difficulty moving her right upper extremity. She denies any chest pain, belly pain, change in bladder or bowel habits. She has bilateral lower extremity edema, right greater than left.    ______________________________________________________________________  DISCHARGE SUMMARY/HOSPITAL COURSE:  for full details see H&P, daily progress notes, labs, consult notes. Asthmatic bronchitis / CAP? Recent fall  Deconditioning  Right hip pain and right shoulder pain and weakness from recent fall. CT pelvis negative for fracture  x-ray right shoulder No acute abnormality. Osteopenia and degenerative changes  CT head was ordered, but do not feel it is necessary at this time as patient is mentating and she would need a sedative to undergo this test which is not ideal in this 81 yo  Doppler negative for DVT  CTA chest  1. No evidence pulmonary embolism. 2. Small right pleural effusion. Bibasilar patchy airspace consolidation. 3. Several nonspecific right lung subcentimeter nodular densities. Consider  short interval follow-up. 4. Moderate-sized hiatal hernia.     Abx was stopped as PCT negative.    COVID-19 negative  Echo EF 55-60%, mild AS, trace MR, RV normal  Today on 3 liter oxygen as tolerated  c/w PTA oral lasix 40  Tapering steroids, oral prednisone 20 mg daily for 3 days and stop  D/c to SNF today  Discussed with daughter at bedside    CAD  GERD  Hypothyroidism  Resume home meds     Hard of hearing        _______________________________________________________________________  Patient seen and examined by me on discharge day. Pertinent Findings:  Gen:    Not in distress  Chest: Clear lungs  CVS:   Regular rhythm. No edema  Abd:  Soft, not distended, not tender  Neuro:  Alert,   _______________________________________________________________________  DISCHARGE MEDICATIONS:   Current Discharge Medication List      START taking these medications    Details   predniSONE (DELTASONE) 20 mg tablet Take 1 Tablet by mouth daily (with breakfast) for 3 days. For 3 days and stop  Qty: 3 Tablet, Refills: 0  Start date: 5/17/2022, End date: 5/20/2022      albuterol (ProAir HFA) 90 mcg/actuation inhaler Take 1 Puff by inhalation every six (6) hours as needed for Wheezing. Qty: 18 g, Refills: 1  Start date: 5/17/2022         CONTINUE these medications which have NOT CHANGED    Details   !! acetaminophen (TYLENOL) 325 mg tablet Take 2 Tablets by mouth every six (6) hours as needed for Pain. Qty: 20 Tablet, Refills: 0      naproxen (NAPROSYN) 250 mg tablet Take 500 mg by mouth two (2) times daily (with meals). omeprazole (PRILOSEC) 20 mg capsule Take 20 mg by mouth daily. cyclobenzaprine (FLEXERIL) 10 mg tablet Take 10 mg by mouth.      latanoprost (XALATAN) 0.005 % ophthalmic solution Administer 1 Drop to both eyes nightly. cholecalciferol, vitamin D3, 2,000 unit tab Take  by mouth. senna-docusate (PERICOLACE) 8.6-50 mg per tablet Take 1 Tab by mouth two (2) times a day.   Qty: 30 Tab, Refills: 0      !! acetaminophen 650 mg Tab Take 650 mg by mouth every four (4) hours as needed for Pain. Qty: 40 Tab, Refills: 0      furosemide (LASIX) 40 mg tablet Take 40 mg by mouth daily. levothyroxine (SYNTHROID) 100 mcg tablet Take 100 mcg by mouth daily. lovastatin (MEVACOR) 20 mg tablet Take 20 mg by mouth daily. SENNOSIDES (SENNA LAX PO) Take  by mouth as needed. !! - Potential duplicate medications found. Please discuss with provider. STOP taking these medications       diclofenac (Voltaren) 1 % gel Comments:   Reason for Stopping:                 Patient Follow Up Instructions:    Activity: Activity as tolerated  Diet: Cardiac Diet  Wound Care: None needed    Follow-up with     Follow-up Information     Follow up With Specialties Details Why Contact Info    [address] [place] [address],[address]  [phone]      [address] [place] [address],[address]  [phone]        ________________________________________________________________    Risk of deterioration: Moderate    Condition at Discharge:  Stable  __________________________________________________________________    Disposition  SNF/LTC    ____________________________________________________________________    Code Status: DNR/DNI  ___________________________________________________________________      Total time in minutes spent coordinating this discharge (includes going over instructions, follow-up, prescriptions, and preparing report for sign off to her PCP) :  >30 minutes    Signed:  Dutch Castleman, MD

## 2022-05-17 NOTE — PROGRESS NOTES
TRANSITION OF CARE - SBAR OUT    Patient is being transferred to [facility], Room# 106 A. Report GIVEN TO Murray Barger RN on [facility] for routine progression of care. Report is consisted of the following information SBAR. Patient transferred to receiving unit by: [facility]    Called outstanding consults: Yes   Collected routine labs: Yes     All current orders reviewed with accepting nurse: Yes    The following personal items will be sent with the patient during transfer to the floor: All valuables:   Dental Appliances: None     Hearing Aid: Bilateral,With patient,At bedside  Jewelry: None  Clothing: With patient  Other Valuables: With patient       CARDIAC MONITORING ORDERED: No      The following CURRENT information were reported to the receiving RN:    CODE STATUS: DNR    NIH SCORE:    KT SCREENING:      NEURO ASSESSMENT: Neuro  Neurologic State: Alert (05/16/22 1948)  Orientation Level: Oriented to person,Oriented to place (05/16/22 1948)  Cognition: Follows commands (05/17/22 0748)  Speech: Clear (05/17/22 0748)  Assessment L Pupil: Brisk (05/16/22 1948)  Size L Pupil (mm): 3 (05/14/22 0847)  Assessment R Pupil: Brisk (05/16/22 1948)  Size R Pupil (mm): 3 (05/14/22 0847)  LUE Motor Response: Weak (05/17/22 0748)  LLE Motor Response: Weak (05/17/22 0748)  RUE Motor Response: Weak (05/17/22 0748)  RLE Motor Response: Weak (05/17/22 0748)      RESTRAINTS IN USE: No      IS DOCUMENTATION COMPLETE: Yes      Vital Signs  Level of Consciousness: Alert (0) (05/17/22 1201)  Temp: 98.1 °F (36.7 °C) (05/17/22 1201)  Temp Source: Oral (05/17/22 1201)  Pulse (Heart Rate): 64 (05/17/22 1201)  Heart Rate Source: Monitor (05/17/22 1201)  Cardiac Rhythm: 1\" AV Block (05/16/22 1948)  Resp Rate: 18 (05/17/22 1201)  BP: (!) 139/55 (05/17/22 1201)  MAP (Monitor): 92 (05/10/22 0002)  MAP (Calculated): 83 (05/17/22 1201)  BP 1 Location: Left upper arm (05/17/22 1201)  BP 1 Method: Automatic (05/17/22 1201)  BP Patient Position:  At rest (05/17/22 1201)  MEWS Score: 1 (05/17/22 1201)  Pain 1  Pain Scale 1: Numeric (0 - 10) (05/17/22 0342)  Pain Intensity 1: 0 (05/17/22 0342)  Patient Stated Pain Goal: 0 (05/17/22 0342)  Pain Reassessment 1: Yes (05/12/22 1124)  Pain Onset 1: acute (05/12/22 1054)  Pain Location 1: Back (05/12/22 1054)  Pain Orientation 1: Mid (05/12/22 1054)  Pain Description 1: Aching (05/12/22 1054)  Pain Intervention(s) 1: Medication (see MAR) (05/12/22 1054)      OXYGEN: Oxygen Therapy  O2 Device: Nasal cannula (05/15/22 0805)  Skin Assessment: Clean, dry, & intact (05/12/22 1946)  O2 Flow Rate (L/min): 4 l/min (05/15/22 0805)    SUSY FALL RISK: [facility] Fall Risk  Mobility: Ambulates or transfers with assist devices or assistance (05/16/22 1948)  Mobility Interventions: Bed/chair exit alarm (05/16/22 1948)  Mentation: Alert, oriented x 3 (05/16/22 1948)  Mentation Interventions: Bed/chair exit alarm (05/16/22 1948)  Medication: Patient receiving anticonvulsants, sedatives(tranquilizers), psychotropics or hypnotics, hypoglycemics, narcotics, sleep aids, antihypertensives, laxatives, or diuretics (05/16/22 1948)  Medication Interventions: Bed/chair exit alarm (05/16/22 1948)  Elimination: Needs assistance with toileting (05/16/22 1948)  Elimination Interventions: Bed/chair exit alarm,Call light in reach (05/16/22 1948)  Prior Fall History: Before admission in past 12 months _home or previous inpatient care) (05/16/22 1948)  History of Falls Interventions: Bed/chair exit alarm (05/16/22 0800)  Total Score: 4 (05/16/22 1948)  Standard Fall Precautions: Yes (05/16/22 1948)  High Fall Risk: Yes (05/16/22 1948)      WOUNDS: Yes peeling skin/ open blisters on buttocks    URINARY: voiding    LINE ACCESS:   None    Opportunity for questions and clarification were provided.   Ibrahima Stevens RN

## 2022-07-25 ENCOUNTER — APPOINTMENT (OUTPATIENT)
Dept: CT IMAGING | Age: 87
End: 2022-07-25
Attending: EMERGENCY MEDICINE
Payer: MEDICARE

## 2022-07-25 ENCOUNTER — APPOINTMENT (OUTPATIENT)
Dept: VASCULAR SURGERY | Age: 87
End: 2022-07-25
Attending: EMERGENCY MEDICINE
Payer: MEDICARE

## 2022-07-25 ENCOUNTER — APPOINTMENT (OUTPATIENT)
Dept: GENERAL RADIOLOGY | Age: 87
End: 2022-07-25
Attending: EMERGENCY MEDICINE
Payer: MEDICARE

## 2022-07-25 ENCOUNTER — HOSPITAL ENCOUNTER (EMERGENCY)
Age: 87
Discharge: HOME | End: 2022-07-25
Attending: EMERGENCY MEDICINE
Payer: MEDICARE

## 2022-07-25 VITALS
HEIGHT: 67 IN | DIASTOLIC BLOOD PRESSURE: 68 MMHG | WEIGHT: 162 LBS | TEMPERATURE: 98 F | OXYGEN SATURATION: 96 % | BODY MASS INDEX: 25.43 KG/M2 | SYSTOLIC BLOOD PRESSURE: 158 MMHG | HEART RATE: 82 BPM | RESPIRATION RATE: 18 BRPM

## 2022-07-25 DIAGNOSIS — R30.0 DYSURIA: Primary | ICD-10-CM

## 2022-07-25 DIAGNOSIS — M79.89 RIGHT LEG SWELLING: ICD-10-CM

## 2022-07-25 DIAGNOSIS — M71.21 BAKER'S CYST, RIGHT: ICD-10-CM

## 2022-07-25 DIAGNOSIS — M25.552 LEFT HIP PAIN: ICD-10-CM

## 2022-07-25 LAB
ANION GAP SERPL CALC-SCNC: 6 MMOL/L (ref 5–15)
BASOPHILS # BLD: 0 K/UL (ref 0–0.1)
BASOPHILS NFR BLD: 0 % (ref 0–1)
BUN SERPL-MCNC: 24 MG/DL (ref 6–20)
BUN/CREAT SERPL: 32 (ref 12–20)
CALCIUM SERPL-MCNC: 9.5 MG/DL (ref 8.5–10.1)
CHLORIDE SERPL-SCNC: 101 MMOL/L (ref 97–108)
CO2 SERPL-SCNC: 31 MMOL/L (ref 21–32)
COMMENT, HOLDF: NORMAL
CREAT SERPL-MCNC: 0.76 MG/DL (ref 0.55–1.02)
DIFFERENTIAL METHOD BLD: ABNORMAL
EOSINOPHIL # BLD: 1 K/UL (ref 0–0.4)
EOSINOPHIL NFR BLD: 11 % (ref 0–7)
ERYTHROCYTE [DISTWIDTH] IN BLOOD BY AUTOMATED COUNT: 13.5 % (ref 11.5–14.5)
GLUCOSE SERPL-MCNC: 95 MG/DL (ref 65–100)
HCT VFR BLD AUTO: 35.7 % (ref 35–47)
HGB BLD-MCNC: 11.6 G/DL (ref 11.5–16)
IMM GRANULOCYTES # BLD AUTO: 0 K/UL (ref 0–0.04)
IMM GRANULOCYTES NFR BLD AUTO: 0 % (ref 0–0.5)
LYMPHOCYTES # BLD: 1.6 K/UL (ref 0.8–3.5)
LYMPHOCYTES NFR BLD: 17 % (ref 12–49)
MCH RBC QN AUTO: 31 PG (ref 26–34)
MCHC RBC AUTO-ENTMCNC: 32.5 G/DL (ref 30–36.5)
MCV RBC AUTO: 95.5 FL (ref 80–99)
MONOCYTES # BLD: 0.8 K/UL (ref 0–1)
MONOCYTES NFR BLD: 9 % (ref 5–13)
NEUTS SEG # BLD: 5.9 K/UL (ref 1.8–8)
NEUTS SEG NFR BLD: 63 % (ref 32–75)
NRBC # BLD: 0 K/UL (ref 0–0.01)
NRBC BLD-RTO: 0 PER 100 WBC
PLATELET # BLD AUTO: 219 K/UL (ref 150–400)
PMV BLD AUTO: 9.6 FL (ref 8.9–12.9)
POTASSIUM SERPL-SCNC: 4.1 MMOL/L (ref 3.5–5.1)
RBC # BLD AUTO: 3.74 M/UL (ref 3.8–5.2)
SAMPLES BEING HELD,HOLD: NORMAL
SODIUM SERPL-SCNC: 138 MMOL/L (ref 136–145)
WBC # BLD AUTO: 9.3 K/UL (ref 3.6–11)

## 2022-07-25 PROCEDURE — 99284 EMERGENCY DEPT VISIT MOD MDM: CPT

## 2022-07-25 PROCEDURE — 93970 EXTREMITY STUDY: CPT

## 2022-07-25 PROCEDURE — 80048 BASIC METABOLIC PNL TOTAL CA: CPT

## 2022-07-25 PROCEDURE — 73502 X-RAY EXAM HIP UNI 2-3 VIEWS: CPT

## 2022-07-25 PROCEDURE — 74011250637 HC RX REV CODE- 250/637: Performed by: EMERGENCY MEDICINE

## 2022-07-25 PROCEDURE — 74011250636 HC RX REV CODE- 250/636: Performed by: EMERGENCY MEDICINE

## 2022-07-25 PROCEDURE — 36415 COLL VENOUS BLD VENIPUNCTURE: CPT

## 2022-07-25 PROCEDURE — 85025 COMPLETE CBC W/AUTO DIFF WBC: CPT

## 2022-07-25 PROCEDURE — 73700 CT LOWER EXTREMITY W/O DYE: CPT

## 2022-07-25 RX ORDER — METHOCARBAMOL 500 MG/1
500 TABLET, FILM COATED ORAL
Status: COMPLETED | OUTPATIENT
Start: 2022-07-25 | End: 2022-07-25

## 2022-07-25 RX ORDER — CEFDINIR 300 MG/1
300 CAPSULE ORAL 2 TIMES DAILY
Qty: 14 CAPSULE | Refills: 0 | Status: SHIPPED | OUTPATIENT
Start: 2022-07-25 | End: 2022-07-25

## 2022-07-25 RX ORDER — HYDROCODONE BITARTRATE AND ACETAMINOPHEN 5; 325 MG/1; MG/1
1 TABLET ORAL
Status: COMPLETED | OUTPATIENT
Start: 2022-07-25 | End: 2022-07-25

## 2022-07-25 RX ORDER — METHOCARBAMOL 750 MG/1
750 TABLET, FILM COATED ORAL 4 TIMES DAILY
Qty: 28 TABLET | Refills: 0 | Status: SHIPPED | OUTPATIENT
Start: 2022-07-25 | End: 2022-07-25

## 2022-07-25 RX ORDER — ACETAMINOPHEN 325 MG/1
650 TABLET ORAL
Status: COMPLETED | OUTPATIENT
Start: 2022-07-25 | End: 2022-07-25

## 2022-07-25 RX ORDER — METHOCARBAMOL 750 MG/1
750 TABLET, FILM COATED ORAL 4 TIMES DAILY
Qty: 28 TABLET | Refills: 0 | Status: SHIPPED | OUTPATIENT
Start: 2022-07-25 | End: 2022-08-01

## 2022-07-25 RX ORDER — CEFDINIR 300 MG/1
300 CAPSULE ORAL 2 TIMES DAILY
Qty: 14 CAPSULE | Refills: 0 | Status: SHIPPED | OUTPATIENT
Start: 2022-07-25 | End: 2022-08-01

## 2022-07-25 RX ADMIN — SODIUM CHLORIDE 1000 ML: 900 INJECTION, SOLUTION INTRAVENOUS at 18:45

## 2022-07-25 RX ADMIN — HYDROCODONE BITARTRATE AND ACETAMINOPHEN 1 TABLET: 5; 325 TABLET ORAL at 23:01

## 2022-07-25 RX ADMIN — METHOCARBAMOL 500 MG: 500 TABLET ORAL at 21:38

## 2022-07-25 RX ADMIN — ACETAMINOPHEN 650 MG: 325 TABLET ORAL at 17:39

## 2022-07-25 NOTE — ED PROVIDER NOTES
58-year-old female presents from assisted living via EMS compared to her daughters. She is seen in the hallway where 2 daughters and at the bedside. Patient is extremely hard of hearing and most of the history is obtained from her daughter. She states the patient woke up around 3 AM this morning with severe burning pain in both of her feet. She sat in her recliner and prop her feet up which did help with the symptoms somewhat and she was able to go back to sleep. Throughout the rest of the day she has had episodes of sharp pain in her left hip and left groin that radiated down the left leg. She denies any falls or injuries. Pain is worsened with movement and certain positions. Denies any previous injury or surgery to the left hip. Daughter also notes that her right leg is extremely swollen. She adds that the patient is also been experiencing dysuria and has completed a course of antibiotics for urinary tract infection but still is reporting burning whenever she urinates. No known fevers at home. No vomiting or diarrhea. Daughter states that she feels the patient is more confused than usual.  According to the medical record, her past medical history significant for CAD, right breast cancer, GERD, ulcer disease, left knee surgery, hysterectomy.        Past Medical History:   Diagnosis Date    Arthritis     Breast cancer ([facility] [address] 1998    right breast    CAD (coronary artery disease)     2 heart attacks    Cancer (HCC)     breast - treated with surgery    GERD (gastroesophageal reflux disease)     hiatal hernia    Other ill-defined conditions(799.89)     increased cholesterol    PUD (peptic ulcer disease)     Thyroid disease        Past Surgical History:   Procedure Laterality Date    HX CHOLECYSTECTOMY      HX DILATION AND CURETTAGE      x 3-4    HX HYSTERECTOMY      total hysterectomy    HX MASTECTOMY Right 1998    right    HX ORTHOPAEDIC  12/31/12    PATELLA OPEN REDUCTION INTERNAL FIXATION (Left Family History:   Problem Relation Age of Onset    Thyroid Disease Mother     Lung Disease Father     Other Daughter         \"almost lost her\" - unsure of details    Breast Cancer Daughter 55       Social History     Socioeconomic History    Marital status:      Spouse name: Not on file    Number of children: Not on file    Years of education: Not on file    Highest education level: Not on file   Occupational History    Not on file   Tobacco Use    Smoking status: Former    Smokeless tobacco: Not on file    Tobacco comments:     former cigarette smoker for 5 yrs/quit 15+ yrs ago   Substance and Sexual Activity    Alcohol use: No    Drug use: Not on file    Sexual activity: Not on file   Other Topics Concern    Not on file   Social History Narrative    Not on file     Social Determinants of Health     Financial Resource Strain: Not on file   Food Insecurity: Not on file   Transportation Needs: Not on file   Physical Activity: Not on file   Stress: Not on file   Social Connections: Not on file   Intimate Partner Violence: Not on file   Housing Stability: Not on file         ALLERGIES: Codeine and Penicillins    Review of Systems   Constitutional:  Negative for fever. HENT:  Negative for facial swelling. Eyes:  Negative for visual disturbance. Respiratory:  Negative for chest tightness. Cardiovascular:  Negative for chest pain. Gastrointestinal:  Negative for abdominal pain. Genitourinary:  Negative for difficulty urinating and dysuria. Musculoskeletal:  Negative for arthralgias. Skin:  Negative for rash. Neurological:  Negative for headaches. Hematological:  Negative for adenopathy. Psychiatric/Behavioral:  Negative for suicidal ideas.       Vitals:    07/25/22 1413 07/25/22 2132 07/25/22 2133 07/25/22 2140   BP: 139/73   (!) 155/75   Pulse: 82      Resp: 18      Temp: 98 °F (36.7 °C)      SpO2: 94% (!) 89% (!) 84% 97%   Weight: 73.5 kg (162 lb)      Height: 5' 7\" (1.702 m) Physical Exam  Vitals and nursing note reviewed. Constitutional:       General: She is not in acute distress. Appearance: She is well-developed. HENT:      Head: Normocephalic and atraumatic. Eyes:      General: No scleral icterus. Conjunctiva/sclera: Conjunctivae normal.      Pupils: Pupils are equal, round, and reactive to light. Cardiovascular:      Rate and Rhythm: Normal rate. Heart sounds: No murmur heard. Pulmonary:      Effort: Pulmonary effort is normal. No respiratory distress. Abdominal:      General: There is no distension. Musculoskeletal:      Cervical back: Normal range of motion and neck supple. Right lower leg: Edema present. Left lower leg: Edema present. Comments: Pain with passive flexion of the left hip. No obvious deformities. Skin:     General: Skin is warm and dry. Findings: No rash. Neurological:      Mental Status: She is alert and oriented to person, place, and time. MDM  Number of Diagnoses or Management Options  Baker's cyst, right  Dysuria  Left hip pain  Right leg swelling  Diagnosis management comments: Assessment: Patient resting comfortably. Her symptoms seem to have improved since getting Robaxin for muscle spasms. X-ray and CT scan of the left hip are unremarkable. Lower extremity duplex scan was negative for DVT but did show evidence of a Baker's cyst in the right lower leg which explains increased swelling there. Patient is also complaining of continued dysuria despite completing a course of antibiotics. Family is attempting to find out what antibiotic she was on as [facility]. Multiple attempts at obtaining a urine sample in the ED been unsuccessful. Patient has either been incontinent or straight cath did not produce enough urine for testing.   Given that she is still having ongoing symptoms of dysuria, I think reasonable to continue her on an antibiotic for another 5 days and have her follow-up with her primary care doctor. Family is attempting to find out exactly what antibiotic she been prescribed for so we can continue the treatment.        Amount and/or Complexity of Data Reviewed  Clinical lab tests: reviewed  Tests in the radiology section of CPT®: reviewed           Procedures